# Patient Record
Sex: MALE | Race: BLACK OR AFRICAN AMERICAN | Employment: FULL TIME | URBAN - METROPOLITAN AREA
[De-identification: names, ages, dates, MRNs, and addresses within clinical notes are randomized per-mention and may not be internally consistent; named-entity substitution may affect disease eponyms.]

---

## 2023-02-15 ENCOUNTER — HOSPITAL ENCOUNTER (EMERGENCY)
Age: 28
Discharge: HOME | End: 2023-02-15
Attending: EMERGENCY MEDICINE

## 2023-02-15 ENCOUNTER — APPOINTMENT (OUTPATIENT)
Dept: GENERAL RADIOLOGY | Age: 28
End: 2023-02-15

## 2023-02-15 VITALS
TEMPERATURE: 96.8 F | DIASTOLIC BLOOD PRESSURE: 73 MMHG | WEIGHT: 160 LBS | RESPIRATION RATE: 18 BRPM | HEART RATE: 96 BPM | HEIGHT: 75 IN | BODY MASS INDEX: 19.89 KG/M2 | SYSTOLIC BLOOD PRESSURE: 126 MMHG | OXYGEN SATURATION: 98 %

## 2023-02-15 DIAGNOSIS — M79.675 TOE PAIN, LEFT: Primary | ICD-10-CM

## 2023-02-15 PROCEDURE — 6370000000 HC RX 637 (ALT 250 FOR IP)

## 2023-02-15 PROCEDURE — 99283 EMERGENCY DEPT VISIT LOW MDM: CPT

## 2023-02-15 PROCEDURE — 73630 X-RAY EXAM OF FOOT: CPT

## 2023-02-15 RX ORDER — IBUPROFEN 400 MG/1
400 TABLET ORAL ONCE
Status: COMPLETED | OUTPATIENT
Start: 2023-02-15 | End: 2023-02-15

## 2023-02-15 RX ORDER — IBUPROFEN 600 MG/1
600 TABLET ORAL 3 TIMES DAILY PRN
Qty: 21 TABLET | Refills: 0 | Status: SHIPPED | OUTPATIENT
Start: 2023-02-15 | End: 2023-02-22

## 2023-02-15 RX ADMIN — IBUPROFEN 400 MG: 400 TABLET, FILM COATED ORAL at 20:47

## 2023-02-15 ASSESSMENT — PAIN DESCRIPTION - DESCRIPTORS: DESCRIPTORS: ACHING

## 2023-02-15 ASSESSMENT — PAIN DESCRIPTION - LOCATION
LOCATION: TOE (COMMENT WHICH ONE)
LOCATION: FOOT

## 2023-02-15 ASSESSMENT — PAIN - FUNCTIONAL ASSESSMENT: PAIN_FUNCTIONAL_ASSESSMENT: 0-10

## 2023-02-15 ASSESSMENT — PAIN SCALES - GENERAL
PAINLEVEL_OUTOF10: 6
PAINLEVEL_OUTOF10: 5

## 2023-02-15 ASSESSMENT — PAIN DESCRIPTION - ORIENTATION: ORIENTATION: LEFT

## 2023-02-15 NOTE — Clinical Note
Linda Godfrey was seen and treated in our emergency department on 2/15/2023. He may return to work on 02/16/2023. If you have any questions or concerns, please don't hesitate to call.       Peña Tanner MD

## 2023-02-16 NOTE — ED NOTES
Pt was discharged from the ER. Discharge paperwork was reviewed with the patient. Patient had no further questions and showed an understanding of instructions.          Jb Young RN  02/15/23 1661

## 2023-02-16 NOTE — ED PROVIDER NOTES
[facility]     Emergency Department     Faculty Attestation        I performed a history and physical examination of the patient and discussed management with the resident. I reviewed the residents note and agree with the documented findings and plan of care. Any areas of disagreement are noted on the chart. I was personally present for the key portions of any procedures. I have documented in the chart those procedures where I was not present during the key portions. I have reviewed the emergency nurses triage note. I agree with the chief complaint, past medical history, past surgical history, allergies, medications, social and family history as documented unless otherwise noted below. For Physician Assistant/ Nurse Practitioner cases/documentation I have personally evaluated this patient and have completed at least one if not all key elements of the E/M (history, physical exam, and MDM). Additional findings are as noted. Vital Signs: BP: 126/73  Heart Rate: 96  Resp: 18  Temp: 96.8 °F (36 °C) SpO2: 98 %  PCP:  Kyrie Valdes MD    Pertinent Comments:     Patient is a 60-year-old male who stubbed his foot putting something together this morning at home. Has pain over the distal metatarsals as well as middle toes. Neurovascular intact surrounding with capillary refill brisk and less than 2 seconds with sensation light touch intact and strength 5/5. Assessment/plan: Patient to distal foot and toes and will obtain x-ray as well as pain control and reevaluate after    Critical Care  None      (Please note that portions of this note were completed with a voice recognition program. Efforts were made to edit the dictations but occasionally words are mis-transcribed.  Whenever words are used in this note in any gender, they shall be construed as though they were used in the gender appropriate to the circumstances; and whenever words are used in this note in the singular or plural form, they shall be construed as though they were used in the form appropriate to the circumstances.)    MD Harry Orellana  Attending Emergency Medicine Physician            Rosalio Berumen MD  02/15/23 Jeaneth Liz MD  02/15/23 2004

## 2023-02-16 NOTE — DISCHARGE INSTRUCTIONS
Thank you for visiting [facility] Emergency Department. You need to call Kailyn Arellano MD to make an appointment as directed for follow up. Should you have any questions regarding your care or further treatment, please call [facility] Emergency Department at [phone]. Please return to emergency department for any new or worsening symptoms including any numbness, weakness, tingling.

## 2023-02-16 NOTE — ED PROVIDER NOTES
[facility] ED  Emergency Department Encounter  Emergency Medicine Resident     Pt Name:Darryl Rodriguez  MRN: [id number]  [place] 1995  Date of evaluation: 2/15/23  PCP:  Patsy Nova MD      [address] [place] [address]       Chief Complaint   Patient presents with    Toe Pain     Left 4th toe, stubbed at work        HISTORY OF PRESENT ILLNESS  (Location/Symptom, Timing/Onset, Context/Setting, Quality, Duration, Modifying Factors, Severity.)      Darryl Rodriguez is a 33 y.o. male who presents with complaints of left dorsal third and fourth toe pain status post stubbing toe on daughter's bed when assembling it this morning. Notes he went to work after that had continued pain. No numbness, weakness, tingling. No other medical problems. Does not take any regular medications. PAST MEDICAL / SURGICAL / SOCIAL / FAMILY HISTORY      has no past medical history on file. Reviewed with patient     has no past surgical history on file. Reviewed with patient    Social History     Socioeconomic History    Marital status: Single     Spouse name: Not on file    Number of children: Not on file    Years of education: Not on file    Highest education level: Not on file   Occupational History    Not on file   Tobacco Use    Smoking status: Never    Smokeless tobacco: Not on file   Substance and Sexual Activity    Alcohol use: No    Drug use: No    Sexual activity: Not on file   Other Topics Concern    Not on file   Social History Narrative    Not on file     Social Determinants of Health     Financial Resource Strain: Not on file   Food Insecurity: Not on file   Transportation Needs: Not on file   Physical Activity: Not on file   Stress: Not on file   Social Connections: Not on file   Intimate Partner Violence: Not on file   Housing Stability: Not on file       History reviewed. No pertinent family history. Allergies:  Patient has no known allergies.     Home Medications:  Prior to Admission medications Medication Sig Start Date End Date Taking? Authorizing Provider   ibuprofen (ADVIL;MOTRIN) 600 MG tablet Take 1 tablet by mouth 3 times daily as needed for Pain 2/15/23 2/22/23 Yes Ap Flores MD   acetaminophen-codeine (TYLENOL/CODEINE #3) 300-30 MG per tablet Take 1 tablet by mouth every 4 hours as needed for Pain 5/28/15   Chuyita Tam PA-C   EPINEPHrine (EPIPEN 2-LIBERTAD) 0.3 MG/0.3ML SOAJ injection Inject 0.3 mLs into the muscle once as needed for up to 1 dose. Use as directed for allergic reaction 2/25/15 2/25/15  Maribeth Kaye MD   desoximetasone (TOPICORT) 0.25 % cream Apply topically 2 times daily. 2/25/15   Maribeth Kaye MD       REVIEW OF SYSTEMS    (2-9 systems for level 4, 10 or more for level 5)      Review of Systems   Constitutional:  Negative for chills and fever. HENT:  Negative for congestion and rhinorrhea. Eyes:  Negative for photophobia and visual disturbance. Respiratory:  Negative for shortness of breath and wheezing. Cardiovascular:  Negative for chest pain and palpitations. Gastrointestinal:  Negative for abdominal pain, nausea and vomiting. Genitourinary:  Negative for dysuria and frequency. Musculoskeletal:  Negative for back pain and neck pain. Positive for left third and fourth toe pain  Skin:  Negative for rash and wound. Neurological:  Negative for dizziness and headaches. PHYSICAL EXAM   (up to 7 for level 4, 8 or more for level 5)      INITIAL VITALS:   /73   Pulse 96   Temp 96.8 °F (36 °C) (Oral)   Resp 18   Ht 6' 3\" (1.905 m)   Wt 160 lb (72.6 kg)   SpO2 98%   BMI 20.00 kg/m²     Physical Exam  Vitals and nursing note reviewed. Constitutional:       General: He is not in acute distress. HENT:      Head: Atraumatic. Right Ear: External ear normal.      Left Ear: External ear normal.      Nose: Nose normal.      Mouth/Throat:      Mouth: Mucous membranes are moist.      Pharynx: Oropharynx is clear.    Eyes: Conjunctiva/sclera: Conjunctivae normal.   Cardiovascular:      Rate and Rhythm: Normal rate and regular rhythm. Pulses: Normal pulses. Pulmonary:      Effort: Pulmonary effort is normal. No respiratory distress. Breath sounds: Normal breath sounds. No wheezing. Abdominal:      Palpations: Abdomen is soft. Tenderness: There is no abdominal tenderness. Musculoskeletal:         General: Normal range of motion. Cervical back: Normal range of motion. Tenderness to dorsal aspect of the proximal third and fourth great toe with minimal ecchymosis, DP pulse intact, neurovascularly intact  Skin:     General: Skin is warm and dry. Capillary Refill: Capillary refill takes less than 2 seconds. Neurological:      General: No focal deficit present. Mental Status: He is alert and oriented to person, place, and time. DIFFERENTIAL  DIAGNOSIS     PLAN (LABS / IMAGING / EKG):  Orders Placed This Encounter   Procedures    XR FOOT LEFT (MIN 3 VIEWS)       MEDICATIONS ORDERED:  Orders Placed This Encounter   Medications    ibuprofen (ADVIL;MOTRIN) tablet 400 mg    ibuprofen (ADVIL;MOTRIN) 600 MG tablet     Sig: Take 1 tablet by mouth 3 times daily as needed for Pain     Dispense:  21 tablet     Refill:  0       DDX: Contusion, fracture    DIAGNOSTIC RESULTS / EMERGENCY DEPARTMENT COURSE / MDM   LAB RESULTS:  No results found for this visit on 02/15/23. IMPRESSION: Foot pain    RADIOLOGY:  XR FOOT LEFT (MIN 3 VIEWS)   Final Result   No acute osseous abnormality. EMERGENCY DEPARTMENT COURSE:  71-year-old male presented to ED with complaints of pain to dorsal aspect of third and fourth toe on left foot status post assembling furniture this morning prior to going to work and has had continued pain throughout the day. On exam had tenderness to proximal aspect of third and fourth toes, dorsal aspect, DP pulse intact, neurovascularly intact. Pending x-ray, ibuprofen, reassess. X-rays nonacute. Patient discharged. ED Course as of 02/16/23 0315   Wed Feb 15, 2023   2122 Updated on results, return precautions discussed. Patient agreeable to plan. [AR]      ED Course User Index  [AR] Manuel Mckay MD       No notes of [facility] Admission Criteria type on file. PROCEDURES:  None    CONSULTS:  None    FINAL IMPRESSION      1.  Toe pain, left          DISPOSITION / PLAN     DISPOSITION Decision To Discharge 02/15/2023 08:55:48 PM      PATIENT REFERRED TO:  Dwaine Mane MD  [place] [address]  [address] [address]  [phone]    In 1 week      [facility]  [address] [address]  [phone]    As needed    DISCHARGE MEDICATIONS:  Discharge Medication List as of 2/15/2023  8:58 PM        START taking these medications    Details   ibuprofen (ADVIL;MOTRIN) 600 MG tablet Take 1 tablet by mouth 3 times daily as needed for Pain, Disp-21 tablet, R-0Print             Lizbeth Sanches MD  Emergency Medicine Resident    (Please note that portions of thisnote were completed with a voice recognition program.  Efforts were made to edit the dictations but occasionally words are mis-transcribed.)         Manuel Mckay MD  Resident  02/16/23 5618

## 2023-02-16 NOTE — ED NOTES
Patient presents to the ED today with left toe pain after dropping a metal pole on his left foot this morning. Patient states that the pain has been increasing as the day goes on.      Froy Herrera RN  02/15/23 1950

## 2023-05-26 ENCOUNTER — HOSPITAL ENCOUNTER (INPATIENT)
Age: 28
LOS: 8 days | Discharge: HOME | DRG: 751 | End: 2023-06-03
Attending: EMERGENCY MEDICINE | Admitting: PSYCHIATRY & NEUROLOGY
Payer: COMMERCIAL

## 2023-05-26 DIAGNOSIS — F23 ACUTE PSYCHOSIS (HCC): Primary | ICD-10-CM

## 2023-05-26 LAB
ALBUMIN SERPL-MCNC: 4.5 G/DL (ref 3.5–5.2)
ALP SERPL-CCNC: 75 U/L (ref 40–129)
ALT SERPL-CCNC: 30 U/L (ref 5–41)
ANION GAP SERPL CALCULATED.3IONS-SCNC: 12 MMOL/L (ref 9–17)
APAP SERPL-MCNC: <5 UG/ML (ref 10–30)
AST SERPL-CCNC: 28 U/L
ATYPICAL LYMPHOCYTE ABSOLUTE COUNT: 0.17 K/UL
ATYPICAL LYMPHOCYTES: 2 %
BASOPHILS # BLD: 0 K/UL (ref 0–0.2)
BASOPHILS NFR BLD: 0 % (ref 0–2)
BILIRUB SERPL-MCNC: 0.8 MG/DL (ref 0.3–1.2)
BUN SERPL-MCNC: 9 MG/DL (ref 6–20)
CALCIUM SERPL-MCNC: 9.5 MG/DL (ref 8.6–10.4)
CHLORIDE SERPL-SCNC: 102 MMOL/L (ref 98–107)
CO2 SERPL-SCNC: 25 MMOL/L (ref 20–31)
CREAT SERPL-MCNC: 0.96 MG/DL (ref 0.7–1.2)
EOSINOPHIL # BLD: 0.6 K/UL (ref 0–0.4)
EOSINOPHILS RELATIVE PERCENT: 7 % (ref 0–4)
ERYTHROCYTE [DISTWIDTH] IN BLOOD BY AUTOMATED COUNT: 15 % (ref 11.5–14.9)
ETHANOL PERCENT: <0.01 %
ETHANOLAMINE SERPL-MCNC: <10 MG/DL
GFR SERPL CREATININE-BSD FRML MDRD: >60 ML/MIN/1.73M2
GLUCOSE SERPL-MCNC: 88 MG/DL (ref 70–99)
HCT VFR BLD AUTO: 45.7 % (ref 41–53)
HGB BLD-MCNC: 15.7 G/DL (ref 13.5–17.5)
LYMPHOCYTES # BLD: 29 % (ref 24–44)
LYMPHOCYTES NFR BLD: 2.49 K/UL (ref 1–4.8)
MCH RBC QN AUTO: 33 PG (ref 26–34)
MCHC RBC AUTO-ENTMCNC: 34.3 G/DL (ref 31–37)
MCV RBC AUTO: 96.3 FL (ref 80–100)
MONOCYTES NFR BLD: 0.77 K/UL (ref 0.1–1.3)
MONOCYTES NFR BLD: 9 % (ref 1–7)
MORPHOLOGY: ABNORMAL
NEUTROPHILS NFR BLD: 53 % (ref 36–66)
NEUTS SEG NFR BLD: 4.57 K/UL (ref 1.3–9.1)
PLATELET # BLD AUTO: 220 K/UL (ref 150–450)
PMV BLD AUTO: 8.6 FL (ref 6–12)
POTASSIUM SERPL-SCNC: 4.2 MMOL/L (ref 3.7–5.3)
PROT SERPL-MCNC: 7.1 G/DL (ref 6.4–8.3)
RBC # BLD AUTO: 4.75 M/UL (ref 4.5–5.9)
SALICYLATES SERPL-MCNC: <1 MG/DL (ref 3–10)
SODIUM SERPL-SCNC: 139 MMOL/L (ref 135–144)
WBC OTHER # BLD: 8.6 K/UL (ref 3.5–11)

## 2023-05-26 PROCEDURE — 85025 COMPLETE CBC W/AUTO DIFF WBC: CPT

## 2023-05-26 PROCEDURE — 2040000000 HC PSYCH ICU R&B

## 2023-05-26 PROCEDURE — 6360000002 HC RX W HCPCS

## 2023-05-26 PROCEDURE — G0480 DRUG TEST DEF 1-7 CLASSES: HCPCS

## 2023-05-26 PROCEDURE — 80179 DRUG ASSAY SALICYLATE: CPT

## 2023-05-26 PROCEDURE — 36415 COLL VENOUS BLD VENIPUNCTURE: CPT

## 2023-05-26 PROCEDURE — 99285 EMERGENCY DEPT VISIT HI MDM: CPT

## 2023-05-26 PROCEDURE — 80053 COMPREHEN METABOLIC PANEL: CPT

## 2023-05-26 PROCEDURE — 80143 DRUG ASSAY ACETAMINOPHEN: CPT

## 2023-05-26 RX ORDER — TRAZODONE HYDROCHLORIDE 50 MG/1
50 TABLET ORAL NIGHTLY PRN
Status: DISCONTINUED | OUTPATIENT
Start: 2023-05-26 | End: 2023-06-03 | Stop reason: HOSPADM

## 2023-05-26 RX ORDER — DIPHENHYDRAMINE HYDROCHLORIDE 50 MG/ML
50 INJECTION INTRAMUSCULAR; INTRAVENOUS EVERY 6 HOURS PRN
Status: DISCONTINUED | OUTPATIENT
Start: 2023-05-26 | End: 2023-06-03 | Stop reason: HOSPADM

## 2023-05-26 RX ORDER — M-VIT,TX,IRON,MINS/CALC/FOLIC 27MG-0.4MG
1 TABLET ORAL DAILY
Status: ON HOLD | COMMUNITY
End: 2023-05-27

## 2023-05-26 RX ORDER — LORAZEPAM 2 MG/ML
INJECTION INTRAMUSCULAR
Status: COMPLETED
Start: 2023-05-26 | End: 2023-05-26

## 2023-05-26 RX ORDER — IBUPROFEN 400 MG/1
400 TABLET ORAL EVERY 6 HOURS PRN
Status: DISCONTINUED | OUTPATIENT
Start: 2023-05-26 | End: 2023-06-03 | Stop reason: HOSPADM

## 2023-05-26 RX ORDER — HALOPERIDOL 5 MG/1
5 TABLET ORAL EVERY 6 HOURS PRN
Status: DISCONTINUED | OUTPATIENT
Start: 2023-05-26 | End: 2023-06-03 | Stop reason: HOSPADM

## 2023-05-26 RX ORDER — HALOPERIDOL 5 MG/ML
INJECTION INTRAMUSCULAR
Status: COMPLETED
Start: 2023-05-26 | End: 2023-05-26

## 2023-05-26 RX ORDER — RISPERIDONE 1 MG/1
1 TABLET ORAL 2 TIMES DAILY
Status: ON HOLD | COMMUNITY
End: 2023-05-27

## 2023-05-26 RX ORDER — HALOPERIDOL 5 MG/ML
5 INJECTION INTRAMUSCULAR EVERY 6 HOURS PRN
Status: DISCONTINUED | OUTPATIENT
Start: 2023-05-26 | End: 2023-06-03 | Stop reason: HOSPADM

## 2023-05-26 RX ORDER — LORAZEPAM 2 MG/ML
2 INJECTION INTRAMUSCULAR EVERY 6 HOURS PRN
Status: DISCONTINUED | OUTPATIENT
Start: 2023-05-26 | End: 2023-06-03 | Stop reason: HOSPADM

## 2023-05-26 RX ORDER — DIPHENHYDRAMINE HYDROCHLORIDE 50 MG/ML
INJECTION INTRAMUSCULAR; INTRAVENOUS
Status: COMPLETED
Start: 2023-05-26 | End: 2023-05-26

## 2023-05-26 RX ORDER — HYDROXYZINE 50 MG/1
50 TABLET, FILM COATED ORAL 3 TIMES DAILY PRN
Status: DISCONTINUED | OUTPATIENT
Start: 2023-05-26 | End: 2023-06-03 | Stop reason: HOSPADM

## 2023-05-26 RX ORDER — ACETAMINOPHEN 325 MG/1
650 TABLET ORAL EVERY 4 HOURS PRN
Status: DISCONTINUED | OUTPATIENT
Start: 2023-05-26 | End: 2023-06-03 | Stop reason: HOSPADM

## 2023-05-26 RX ORDER — MAGNESIUM HYDROXIDE/ALUMINUM HYDROXICE/SIMETHICONE 120; 1200; 1200 MG/30ML; MG/30ML; MG/30ML
30 SUSPENSION ORAL EVERY 6 HOURS PRN
Status: DISCONTINUED | OUTPATIENT
Start: 2023-05-26 | End: 2023-06-03 | Stop reason: HOSPADM

## 2023-05-26 RX ORDER — POLYETHYLENE GLYCOL 3350 17 G/17G
17 POWDER, FOR SOLUTION ORAL DAILY PRN
Status: DISCONTINUED | OUTPATIENT
Start: 2023-05-26 | End: 2023-06-03 | Stop reason: HOSPADM

## 2023-05-26 RX ORDER — LORAZEPAM 1 MG/1
2 TABLET ORAL EVERY 6 HOURS PRN
Status: DISCONTINUED | OUTPATIENT
Start: 2023-05-26 | End: 2023-06-03 | Stop reason: HOSPADM

## 2023-05-26 RX ADMIN — LORAZEPAM 2 MG: 2 INJECTION INTRAMUSCULAR; INTRAVENOUS at 11:30

## 2023-05-26 RX ADMIN — DIPHENHYDRAMINE HYDROCHLORIDE 50 MG: 50 INJECTION, SOLUTION INTRAMUSCULAR; INTRAVENOUS at 11:30

## 2023-05-26 RX ADMIN — HALOPERIDOL LACTATE 5 MG: 5 INJECTION, SOLUTION INTRAMUSCULAR at 11:30

## 2023-05-26 ASSESSMENT — LIFESTYLE VARIABLES
HOW OFTEN DO YOU HAVE A DRINK CONTAINING ALCOHOL: NEVER
HOW MANY STANDARD DRINKS CONTAINING ALCOHOL DO YOU HAVE ON A TYPICAL DAY: PATIENT DOES NOT DRINK

## 2023-05-26 ASSESSMENT — SLEEP AND FATIGUE QUESTIONNAIRES
DO YOU USE A SLEEP AID: NO
DO YOU HAVE DIFFICULTY SLEEPING: NO

## 2023-05-26 ASSESSMENT — PAIN - FUNCTIONAL ASSESSMENT
PAIN_FUNCTIONAL_ASSESSMENT: NONE - DENIES PAIN
PAIN_FUNCTIONAL_ASSESSMENT: NONE - DENIES PAIN

## 2023-05-27 LAB
AMPHET UR QL SCN: NEGATIVE
BACTERIA URNS QL MICRO: NORMAL
BARBITURATES UR QL SCN: NEGATIVE
BENZODIAZ UR QL: NEGATIVE
BILIRUB UR QL STRIP: NEGATIVE
CANNABINOID SCREEN URINE: POSITIVE
CASTS #/AREA URNS LPF: NORMAL /LPF
CLARITY UR: ABNORMAL
COCAINE UR QL SCN: NEGATIVE
COLOR UR: ABNORMAL
EPI CELLS #/AREA URNS HPF: NORMAL /HPF
FENTANYL URINE: NEGATIVE
GLUCOSE UR STRIP-MCNC: NEGATIVE MG/DL
HGB UR QL STRIP.AUTO: NEGATIVE
KETONES UR STRIP-MCNC: ABNORMAL MG/DL
LEUKOCYTE ESTERASE UR QL STRIP: ABNORMAL
METHADONE SCREEN, URINE: NEGATIVE
NITRITE UR QL STRIP: NEGATIVE
OPIATES UR QL SCN: NEGATIVE
OXYCODONE SCREEN URINE: NEGATIVE
PCP UR QL SCN: NEGATIVE
PH UR STRIP: 7.5 [PH] (ref 5–8)
PROT UR STRIP-MCNC: NEGATIVE MG/DL
RBC #/AREA URNS HPF: NORMAL /HPF
SP GR UR STRIP: 1.02 (ref 1–1.03)
TEST INFORMATION: ABNORMAL
UROBILINOGEN UR STRIP-ACNC: ABNORMAL
WBC #/AREA URNS HPF: NORMAL /HPF

## 2023-05-27 PROCEDURE — 99222 1ST HOSP IP/OBS MODERATE 55: CPT | Performed by: INTERNAL MEDICINE

## 2023-05-27 PROCEDURE — 81001 URINALYSIS AUTO W/SCOPE: CPT

## 2023-05-27 PROCEDURE — 80307 DRUG TEST PRSMV CHEM ANLYZR: CPT

## 2023-05-27 PROCEDURE — 6360000002 HC RX W HCPCS: Performed by: NURSE PRACTITIONER

## 2023-05-27 PROCEDURE — 6370000000 HC RX 637 (ALT 250 FOR IP): Performed by: NURSE PRACTITIONER

## 2023-05-27 PROCEDURE — 2040000000 HC PSYCH ICU R&B

## 2023-05-27 PROCEDURE — 6370000000 HC RX 637 (ALT 250 FOR IP): Performed by: PSYCHIATRY & NEUROLOGY

## 2023-05-27 RX ORDER — PALIPERIDONE 3 MG/1
3 TABLET, EXTENDED RELEASE ORAL DAILY
Status: DISCONTINUED | OUTPATIENT
Start: 2023-05-27 | End: 2023-05-28

## 2023-05-27 RX ADMIN — HALOPERIDOL 5 MG: 5 TABLET ORAL at 16:30

## 2023-05-27 RX ADMIN — PALIPERIDONE 3 MG: 3 TABLET, EXTENDED RELEASE ORAL at 10:10

## 2023-05-27 RX ADMIN — HALOPERIDOL LACTATE 5 MG: 5 INJECTION, SOLUTION INTRAMUSCULAR at 07:35

## 2023-05-27 RX ADMIN — DIPHENHYDRAMINE HYDROCHLORIDE 50 MG: 50 INJECTION, SOLUTION INTRAMUSCULAR; INTRAVENOUS at 07:35

## 2023-05-27 RX ADMIN — LORAZEPAM 2 MG: 1 TABLET ORAL at 16:30

## 2023-05-27 RX ADMIN — LORAZEPAM 2 MG: 2 INJECTION INTRAMUSCULAR; INTRAVENOUS at 07:35

## 2023-05-28 PROCEDURE — 2040000000 HC PSYCH ICU R&B

## 2023-05-28 PROCEDURE — 6360000002 HC RX W HCPCS: Performed by: NURSE PRACTITIONER

## 2023-05-28 PROCEDURE — 6370000000 HC RX 637 (ALT 250 FOR IP): Performed by: PSYCHIATRY & NEUROLOGY

## 2023-05-28 PROCEDURE — 6370000000 HC RX 637 (ALT 250 FOR IP): Performed by: NURSE PRACTITIONER

## 2023-05-28 RX ORDER — PALIPERIDONE 6 MG/1
6 TABLET, EXTENDED RELEASE ORAL DAILY
Status: DISCONTINUED | OUTPATIENT
Start: 2023-05-29 | End: 2023-05-30

## 2023-05-28 RX ADMIN — LORAZEPAM 2 MG: 2 INJECTION INTRAMUSCULAR; INTRAVENOUS at 16:41

## 2023-05-28 RX ADMIN — PALIPERIDONE 3 MG: 3 TABLET, EXTENDED RELEASE ORAL at 08:05

## 2023-05-28 RX ADMIN — HALOPERIDOL 5 MG: 5 TABLET ORAL at 09:13

## 2023-05-28 RX ADMIN — HALOPERIDOL LACTATE 5 MG: 5 INJECTION, SOLUTION INTRAMUSCULAR at 16:41

## 2023-05-28 RX ADMIN — DIPHENHYDRAMINE HYDROCHLORIDE 50 MG: 50 INJECTION, SOLUTION INTRAMUSCULAR; INTRAVENOUS at 16:41

## 2023-05-28 RX ADMIN — LORAZEPAM 2 MG: 1 TABLET ORAL at 09:13

## 2023-05-28 RX ADMIN — HYDROXYZINE HYDROCHLORIDE 50 MG: 50 TABLET, FILM COATED ORAL at 09:13

## 2023-05-29 PROCEDURE — 6370000000 HC RX 637 (ALT 250 FOR IP): Performed by: PSYCHIATRY & NEUROLOGY

## 2023-05-29 PROCEDURE — 1240000000 HC EMOTIONAL WELLNESS R&B

## 2023-05-29 PROCEDURE — 6360000002 HC RX W HCPCS: Performed by: NURSE PRACTITIONER

## 2023-05-29 PROCEDURE — 6370000000 HC RX 637 (ALT 250 FOR IP): Performed by: NURSE PRACTITIONER

## 2023-05-29 RX ADMIN — HALOPERIDOL LACTATE 5 MG: 5 INJECTION, SOLUTION INTRAMUSCULAR at 10:18

## 2023-05-29 RX ADMIN — LORAZEPAM 2 MG: 2 INJECTION INTRAMUSCULAR; INTRAVENOUS at 10:18

## 2023-05-29 RX ADMIN — LORAZEPAM 2 MG: 1 TABLET ORAL at 07:03

## 2023-05-29 RX ADMIN — DIPHENHYDRAMINE HYDROCHLORIDE 50 MG: 50 INJECTION, SOLUTION INTRAMUSCULAR; INTRAVENOUS at 10:18

## 2023-05-29 RX ADMIN — HALOPERIDOL 5 MG: 5 TABLET ORAL at 07:04

## 2023-05-29 RX ADMIN — PALIPERIDONE 6 MG: 6 TABLET, EXTENDED RELEASE ORAL at 07:35
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
## 2023-05-29 NOTE — BH NOTE
Emergency Medication Follow-Up Note:    PRN medication of IM Haldol 5 mg, Ativan 2 mg, Benadryl 50 mg was effective as evidence by patient resting in room. Patient denies medication side effects. Will continue to monitor and provide support as needed.
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
## 2023-05-30 PROCEDURE — 6360000002 HC RX W HCPCS: Performed by: NURSE PRACTITIONER

## 2023-05-30 PROCEDURE — 1240000000 HC EMOTIONAL WELLNESS R&B

## 2023-05-30 PROCEDURE — 6370000000 HC RX 637 (ALT 250 FOR IP)

## 2023-05-30 PROCEDURE — 6370000000 HC RX 637 (ALT 250 FOR IP): Performed by: PSYCHIATRY & NEUROLOGY

## 2023-05-30 RX ORDER — PALIPERIDONE 9 MG/1
9 TABLET, EXTENDED RELEASE ORAL DAILY
Status: DISCONTINUED | OUTPATIENT
Start: 2023-05-31 | End: 2023-06-03 | Stop reason: HOSPADM

## 2023-05-30 RX ORDER — DIVALPROEX SODIUM 500 MG/1
500 TABLET, EXTENDED RELEASE ORAL 2 TIMES DAILY
Status: DISCONTINUED | OUTPATIENT
Start: 2023-05-30 | End: 2023-05-31

## 2023-05-30 RX ADMIN — HALOPERIDOL LACTATE 5 MG: 5 INJECTION, SOLUTION INTRAMUSCULAR at 16:23

## 2023-05-30 RX ADMIN — LORAZEPAM 2 MG: 2 INJECTION INTRAMUSCULAR; INTRAVENOUS at 11:31

## 2023-05-30 RX ADMIN — DIPHENHYDRAMINE HYDROCHLORIDE 50 MG: 50 INJECTION, SOLUTION INTRAMUSCULAR; INTRAVENOUS at 11:31

## 2023-05-30 RX ADMIN — PALIPERIDONE 6 MG: 6 TABLET, EXTENDED RELEASE ORAL at 07:59

## 2023-05-30 RX ADMIN — LORAZEPAM 2 MG: 2 INJECTION INTRAMUSCULAR; INTRAVENOUS at 16:23

## 2023-05-30 RX ADMIN — DIPHENHYDRAMINE HYDROCHLORIDE 50 MG: 50 INJECTION, SOLUTION INTRAMUSCULAR; INTRAVENOUS at 16:23

## 2023-05-30 RX ADMIN — DIVALPROEX SODIUM 500 MG: 500 TABLET, EXTENDED RELEASE ORAL at 21:14

## 2023-05-30 RX ADMIN — HALOPERIDOL LACTATE 5 MG: 5 INJECTION, SOLUTION INTRAMUSCULAR at 11:31

## 2023-05-30 ASSESSMENT — PAIN SCALES - GENERAL: PAINLEVEL_OUTOF10: 4

## 2023-05-30 NOTE — BH NOTE
Emergency Medication Follow-Up Note:    PRN medication of Benadryl 50mg IM, Haldol 5mg, and Ativan 2mg IM  was effective as evidence by patient regaining behavioral control, absence of behavior warranting emergency medication, socializing with peers. Patient denies medication side effects. Will continue to monitor and provide support as needed.

## 2023-05-30 NOTE — GROUP NOTE
Group Therapy Note    Date: 5/30/2023    Group Start Time: 1100  Group End Time: 8381  Group Topic: Cognitive Skills    RAFI Prajapati, CTRS        Group Therapy Note    Attendees: 1/7     Topic: To increase social interaction and to practice decision making, concentration, and communication skills. Patient did not participate in Cognitive Skills Group at 11:00, despite staff encouragement and explanation of benefits. Pt paces in day room and is up at station frequently. Pt complains about TV and radio and makes comments about group \"taking too long\". Pt has an almost constant monologue of complaining, and calls RT and peer \"bitches\", tells peer to \"shut the fu*k up\" when peer is not even talking to him during group time. Q15 minute safety checks maintained for patient safety and will continue to encourage patient to attend unit programming.            Discipline Responsible: Psychoeducational Specialist        Signature:  Sybil Garcia

## 2023-05-30 NOTE — BH NOTE
Emergency PRN Medication Administration Note:      Patient is Agitated and Disruptive, and Dangerous as evidence by threatening to call , making threats \"Keep pushing and find out. You'll see what's bout to happen. \" Patient is unable to redirect. Staff attempted to find and relieve the distress by Talking to patient, Refocusing on new activity, and Offering suggestions. Patient is currently continuing to escalate and accepted PRN medications. Medication Administered as prescribed: Benadryl 50mg IM, Haldol 5mg, and Ativan 2mg IM. Patient Tolerated medication administration. Will continue to monitor, offer support, and reassess.

## 2023-05-30 NOTE — BH NOTE
Emergency PRN Medication Administration Note:      Patient is Agitated and Disruptive as evidence by reaching behind nurses station, non redirectable, responding in aggressive manner. Staff attempted to find and relieve the distress by Talking to patient, Refocusing on new activity, Offering suggestions, Checking for undiagnosed pain, and Administer PRN medications Patient is currently  accepted PRN medications. Medication Administered as prescribed: IM Haldol 5 mg, Ativan 2 mg, Benadryl 50 mg. Patient Tolerated medication administration. Will continue to monitor, offer support, and reassess.

## 2023-05-30 NOTE — BH NOTE
Emergency Medication Follow-Up Note:    PRN medication of Intramuscular Haldol 5 mg, Ativan 2 mg, Benadryl 50 mg was effective as evidence by Patient regain behavioral control, absence of behavior warranting emergency medication, socializing with peers. Patient denies medication side effects. Will continue to monitor and provide support as needed.

## 2023-05-31 PROCEDURE — 6370000000 HC RX 637 (ALT 250 FOR IP): Performed by: PSYCHIATRY & NEUROLOGY

## 2023-05-31 PROCEDURE — 1240000000 HC EMOTIONAL WELLNESS R&B

## 2023-05-31 PROCEDURE — 6370000000 HC RX 637 (ALT 250 FOR IP)

## 2023-05-31 PROCEDURE — 6360000002 HC RX W HCPCS

## 2023-05-31 RX ORDER — BENZTROPINE MESYLATE 1 MG/1
1 TABLET ORAL 2 TIMES DAILY
Status: DISCONTINUED | OUTPATIENT
Start: 2023-05-31 | End: 2023-06-01

## 2023-05-31 RX ORDER — BENZTROPINE MESYLATE 1 MG/ML
1 INJECTION INTRAMUSCULAR; INTRAVENOUS ONCE
Status: COMPLETED | OUTPATIENT
Start: 2023-05-31 | End: 2023-05-31

## 2023-05-31 RX ADMIN — BENZTROPINE MESYLATE 1 MG: 1 INJECTION INTRAMUSCULAR; INTRAVENOUS at 11:47

## 2023-05-31 RX ADMIN — DIVALPROEX SODIUM 750 MG: 500 TABLET, EXTENDED RELEASE ORAL at 20:33

## 2023-05-31 RX ADMIN — PALIPERIDONE 9 MG: 9 TABLET, EXTENDED RELEASE ORAL at 08:36

## 2023-05-31 RX ADMIN — DIVALPROEX SODIUM 500 MG: 500 TABLET, EXTENDED RELEASE ORAL at 08:36

## 2023-05-31 RX ADMIN — BENZTROPINE MESYLATE 1 MG: 1 TABLET ORAL at 10:32

## 2023-05-31 RX ADMIN — BENZTROPINE MESYLATE 1 MG: 1 TABLET ORAL at 20:33

## 2023-05-31 NOTE — GROUP NOTE
Group Therapy Note    Date: 5/31/2023    Group Start Time: 1430  Group End Time: 2419  Group Topic: Cognitive Skills    ALIE Kohler        Group Therapy Note    Attendees: 2/6     Comments:     Topic: To increase social interaction, decision making, and communication skills. Patient did not participate in Cognitive Skills Group at 14:30, despite staff encouragement and explanation of benefits. Pt had initially asked if he had to attend group. Pt was encouraged to attend and observe/talk at first while exploring task. Pt stated he would try that. Pt was up in day room, glanced briefly at task but chose to stay up pacing slowly and singing at times to music. Pt was calm and in behavioral control, much improved from yesterday. Q15 minute safety checks maintained for patient safety and will continue to encourage patient to attend unit programming.            Discipline Responsible: Psychoeducational Specialist        Signature:  Lucero Kent

## 2023-06-01 PROCEDURE — 1240000000 HC EMOTIONAL WELLNESS R&B

## 2023-06-01 PROCEDURE — 6370000000 HC RX 637 (ALT 250 FOR IP)

## 2023-06-01 PROCEDURE — 6370000000 HC RX 637 (ALT 250 FOR IP): Performed by: PSYCHIATRY & NEUROLOGY

## 2023-06-01 RX ORDER — BENZTROPINE MESYLATE 1 MG/1
2 TABLET ORAL 2 TIMES DAILY
Status: DISCONTINUED | OUTPATIENT
Start: 2023-06-01 | End: 2023-06-03 | Stop reason: HOSPADM

## 2023-06-01 RX ADMIN — BENZTROPINE MESYLATE 1 MG: 1 TABLET ORAL at 08:20

## 2023-06-01 RX ADMIN — DIVALPROEX SODIUM 750 MG: 500 TABLET, EXTENDED RELEASE ORAL at 21:53

## 2023-06-01 RX ADMIN — PALIPERIDONE 9 MG: 9 TABLET, EXTENDED RELEASE ORAL at 08:20

## 2023-06-01 RX ADMIN — DIVALPROEX SODIUM 750 MG: 500 TABLET, EXTENDED RELEASE ORAL at 08:20

## 2023-06-01 RX ADMIN — BENZTROPINE MESYLATE 2 MG: 1 TABLET ORAL at 21:53

## 2023-06-01 NOTE — GROUP NOTE
Group Therapy Note    Date: 6/1/2023    Group Start Time: 1430  Group End Time: 2482  Group Topic: Cognitive Skills    [facility]    ALIE Muniz        Group Therapy Note    Attendees: 3/8     Patient's Goal: To increase social interaction,and practice problem solving, and communication skills. Notes:  Pt politely declined to attend group, pt was pacing calmly in hallway. RT reminded pt he was always welcome to attend groups. During group as pt walked by, he did call out relevant answers x2 to group questions. RT gave positive feedback r/t answers . Peers smiled in response to pt's positive answers. Status After Intervention:  Improved briefly     Participation Level: Active Listener from periphery, and briefly interactive      Participation Quality:  Attentive when on periphery and briefly interactive         Speech:  Normal          Thought Process/Content: Logical comments r/t topics x2        Affective Functioning: blunted, brightened        Mood: pacing but calm and in behavioral control, polite, initially aloof of group but did participate x2 rounds from periphery.      Level of consciousness:  Alert, and Attentive      Response to Learning: Attentive , able to verbalize current knowledge /experience x2, and Progressing to goal        Endings: None Reported     Modes of Intervention: Education, Support, Socialization, and Problem-solving        Discipline Responsible: Psychoeducational Specialist     Signature:  Harry Wood

## 2023-06-01 NOTE — GROUP NOTE
Group Therapy Note    Date: 5/31/2023    Group Start Time: 2000  Group End Time: 2030  Group Topic: Wrap-Up    RAFI PRITCHETT    Ethan Blackwood RN        Group Therapy Note    Attendees: 2/7       Patient's Goal:  \"To get my head cleared and get discharged soon. \"    Notes:  Patient participated in Wrap-Up/Relaxation group appropriately this evening. At times he is bizarre, but overall pleasant. He explored coping skills evening that he reports that he will utilize once he is discharged. Status After Intervention:  Improved    Participation Level:  Active Listener and Interactive    Participation Quality: Appropriate      Speech:  normal      Thought Process/Content: Linear at times, paranoid       Affective Functioning: Blunted, bizarre      Mood: anxious      Level of consciousness:  Alert and Oriented x4      Response to Learning: Able to verbalize current knowledge/experience, Able to verbalize/acknowledge new learning, and Able to retain information      Endings: None Reported    Modes of Intervention: Support, Socialization, and Exploration      Discipline Responsible: Registered Nurse      Signature:  Ethan Blackwood RN

## 2023-06-02 LAB
CHOLEST SERPL-MCNC: 132 MG/DL
CHOLESTEROL/HDL RATIO: 2.1
EKG ATRIAL RATE: 84 BPM
EKG P AXIS: 87 DEGREES
EKG P-R INTERVAL: 144 MS
EKG Q-T INTERVAL: 342 MS
EKG QRS DURATION: 80 MS
EKG QTC CALCULATION (BAZETT): 404 MS
EKG R AXIS: 83 DEGREES
EKG T AXIS: 75 DEGREES
EKG VENTRICULAR RATE: 84 BPM
EST. AVERAGE GLUCOSE BLD GHB EST-MCNC: 85 MG/DL
HBA1C MFR BLD: 4.6 % (ref 4–6)
HDLC SERPL-MCNC: 62 MG/DL
LDLC SERPL CALC-MCNC: 63 MG/DL (ref 0–130)
TRIGL SERPL-MCNC: 33 MG/DL

## 2023-06-02 PROCEDURE — 1240000000 HC EMOTIONAL WELLNESS R&B

## 2023-06-02 PROCEDURE — 36415 COLL VENOUS BLD VENIPUNCTURE: CPT

## 2023-06-02 PROCEDURE — 6370000000 HC RX 637 (ALT 250 FOR IP): Performed by: PSYCHIATRY & NEUROLOGY

## 2023-06-02 PROCEDURE — 80061 LIPID PANEL: CPT

## 2023-06-02 PROCEDURE — 6370000000 HC RX 637 (ALT 250 FOR IP)

## 2023-06-02 PROCEDURE — 83036 HEMOGLOBIN GLYCOSYLATED A1C: CPT

## 2023-06-02 PROCEDURE — 93005 ELECTROCARDIOGRAM TRACING: CPT

## 2023-06-02 RX ADMIN — DIVALPROEX SODIUM 750 MG: 500 TABLET, EXTENDED RELEASE ORAL at 20:49

## 2023-06-02 RX ADMIN — BENZTROPINE MESYLATE 2 MG: 1 TABLET ORAL at 08:31

## 2023-06-02 RX ADMIN — DIVALPROEX SODIUM 750 MG: 500 TABLET, EXTENDED RELEASE ORAL at 08:31

## 2023-06-02 RX ADMIN — PALIPERIDONE 9 MG: 9 TABLET, EXTENDED RELEASE ORAL at 08:38

## 2023-06-02 RX ADMIN — BENZTROPINE MESYLATE 2 MG: 1 TABLET ORAL at 20:49
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
## 2023-06-02 NOTE — GROUP NOTE
Group Therapy Note    Date: 6/2/2023    Group Start Time: 1430  Group End Time: 0812  Group Topic: Cognitive Skills    RAFI Byrne, CTRS        Group Therapy Note    Attendees: 1/9     Topic: To increase social interaction, concentration, and communication skills. Patient did not participate in Cognitive Group at 14:30, despite staff encouragement and explanation of benefits. Pt is up in day room ,polite on approach but seclusive to self during group time. Q15 minute safety checks maintained for patient safety and will continue to encourage patient to attend unit programming.            Discipline Responsible: Psychoeducational Specialist        Signature:  Fernando Araya

## 2023-06-02 NOTE — GROUP NOTE
Group Therapy Note    Date: 6/1/2023    Group Start Time: 2000  Group End Time: 2030  Group Topic: Wrap-Up    RAFI PRITCHETT    Rachel Layne RN        Group Therapy Note    Attendees: 4/10       Patient's Goal:  \"To get discharged tomorrow. \"    Notes:  Patient participated appropriately in group this evening. He reports that he is feeling ready for discharge and spent the evening social with selective peers. Status After Intervention:  Improved    Participation Level:  Active Listener and Interactive    Participation Quality: Appropriate      Speech:  normal      Thought Process/Content: Logical  Linear      Affective Functioning: Flat      Mood: euthymic      Level of consciousness:  Alert and Oriented x4      Response to Learning: Able to verbalize current knowledge/experience, Able to verbalize/acknowledge new learning, and Able to retain information      Endings: None Reported    Modes of Intervention: Support, Socialization, and Exploration      Discipline Responsible: Registered Nurse      Signature:  Rachel Layne RN

## 2023-06-03 VITALS
RESPIRATION RATE: 14 BRPM | OXYGEN SATURATION: 100 % | HEIGHT: 70 IN | WEIGHT: 160 LBS | HEART RATE: 87 BPM | TEMPERATURE: 98.2 F | SYSTOLIC BLOOD PRESSURE: 114 MMHG | BODY MASS INDEX: 22.9 KG/M2 | DIASTOLIC BLOOD PRESSURE: 53 MMHG

## 2023-06-03 PROCEDURE — 6370000000 HC RX 637 (ALT 250 FOR IP)

## 2023-06-03 PROCEDURE — 6370000000 HC RX 637 (ALT 250 FOR IP): Performed by: PSYCHIATRY & NEUROLOGY

## 2023-06-03 RX ORDER — HYDROXYZINE 50 MG/1
50 TABLET, FILM COATED ORAL 3 TIMES DAILY PRN
Qty: 30 TABLET | Refills: 0 | Status: SHIPPED | OUTPATIENT
Start: 2023-06-03 | End: 2023-06-13

## 2023-06-03 RX ORDER — PALIPERIDONE 9 MG/1
9 TABLET, EXTENDED RELEASE ORAL DAILY
Qty: 30 TABLET | Refills: 0 | Status: SHIPPED | OUTPATIENT
Start: 2023-06-04

## 2023-06-03 RX ORDER — DIVALPROEX SODIUM 250 MG/1
750 TABLET, EXTENDED RELEASE ORAL 2 TIMES DAILY
Qty: 180 TABLET | Refills: 0 | Status: SHIPPED | OUTPATIENT
Start: 2023-06-03

## 2023-06-03 RX ORDER — BENZTROPINE MESYLATE 2 MG/1
2 TABLET ORAL 2 TIMES DAILY
Qty: 60 TABLET | Refills: 0 | Status: SHIPPED | OUTPATIENT
Start: 2023-06-03

## 2023-06-03 RX ADMIN — BENZTROPINE MESYLATE 2 MG: 1 TABLET ORAL at 08:18

## 2023-06-03 RX ADMIN — DIVALPROEX SODIUM 750 MG: 500 TABLET, EXTENDED RELEASE ORAL at 08:17

## 2023-06-03 RX ADMIN — PALIPERIDONE 9 MG: 9 TABLET, EXTENDED RELEASE ORAL at 08:17

## 2023-06-03 NOTE — BH NOTE
Patient given tobacco quitline number 79808625165 at this time, refusing to call at this time, states \" I just dont want to quit now\"- patient given information as to the dangers of long term tobacco use.

## 2023-06-03 NOTE — PLAN OF CARE
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 Progressing  Note: 1:1 with pt x ten minutes. Pt encouraged to attend unit programming and interact with peers and staff. Pt also encouraged to tend to hygiene and ADLs. Pt encouraged to discuss feelings with staff and feedback and reassurance provided.        Patient utilizes music videos when possible as well as tv

## 2023-06-03 NOTE — DISCHARGE INSTR - DIET
Good nutrition is important when healing from an illness, injury, or surgery. Follow any nutrition recommendations given to you during your hospital stay. If you were given an oral nutrition supplement while in the hospital, continue to take this supplement at home. You can take it with meals, in-between meals, and/or before bedtime. These supplements can be purchased at most local grocery stores, pharmacies, and chain [employer]-stores. If you have any questions about your diet or nutrition, call the hospital and ask for the dietitian.   Continue to follow recommended diet

## 2023-06-03 NOTE — GROUP NOTE
Group Therapy Note    Date: 6/3/2023    Group Start Time: 5286  Group End Time: 1130  Group Topic: Psychoeducation    ADA TateS        Group Therapy Note    Attendees: 5/10     Patient's Goal:  To improve interpersonal skills and coping skills awareness through collaborating with peers and demonstrating self-expression. Notes:  Patient attended and participated in group. Patient was able to collaborate with peers and demonstrate self-expression. Patient was pleasant and cooperative. Status After Intervention:  Improved    Participation Level:  Active Listener and Interactive    Participation Quality: Appropriate, Attentive, and Sharing      Speech:  normal      Thought Process/Content: Logical and Linear      Affective Functioning: Congruent      Mood: euthymic      Level of consciousness:  Alert and Attentive      Response to Learning: Able to verbalize current knowledge/experience, Able to retain information, Capable of insight, and Progressing to goal      Endings: None Reported    Modes of Intervention: Support, Socialization, and Exploration      Discipline Responsible: Psychoeducational Specialist      Signature:  Soniya Zhang, [address]
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
## 2023-06-03 NOTE — BH NOTE
[facility]  Discharge Note    Pt discharged with followings belongings:   Dental Appliances: None  Vision - Corrective Lenses: None  Hearing Aid: None  Jewelry: None  Body Piercings Removed: N/A  Clothing: Footwear, Jacket/Coat, Pants, Shirt, Socks, Undergarments  Other Valuables: Other (Comment) (NA)   Valuables sent home with  patient. Patient discharges from unit with brother to stay with mom and kids. Patient educated on aftercare instructions: YES  Information faxed to [facility] by Staff  at [employer] [facility] .Patient verbalize understanding of AVS:    Status EXAM upon discharge:  Mental Status and Behavioral Exam  Normal: No  Level of Assistance: Independent/Self  Facial Expression: Brightened  Affect: Appropriate  Level of Consciousness: Alert  Frequency of Checks: 4 times per hour, close  Mood:Normal: Yes  Mood: Anxious  Motor Activity:Normal: Yes  Motor Activity: Agitated  Eye Contact: Good  Observed Behavior: Friendly, Cooperative  Sexual Misconduct History: Current - no  Preception: [place] to person, [place] to time, [place] to place, [place] to situation  Attention:Normal: Yes  Attention: Distractible  Thought Processes: Unremarkable  Thought Content:Normal: Yes  Thought Content: Preoccupations  Depression Symptoms: No problems reported or observed. Anxiety Symptoms: No problems reported or observed. Manuela Symptoms: No problems reported or observed.   Hallucinations: None  Delusions: No  Delusions: Paranoid  Memory:Normal: Yes  Memory: Confabulation  Insight and Judgment: Yes  Insight and Judgment: Poor insight    Tobacco Screening:  Practical Counseling, on admission, janay X, if applicable and completed (first 3 are required if patient doesn't refuse):            ( ) Recognizing danger situations (included triggers and roadblocks)                    ( ) Coping skills (new ways to manage stress,relaxation techniques, changing routine, distraction)

## 2023-06-03 NOTE — GROUP NOTE
Group Therapy Note    Date: 6/2/2023    Group Start Time: 2000  Group End Time: 2030  Group Topic: Wrap-Up    RAFI Harris        Group Therapy Note    Attendees: 8       Patient's Goal:  just keeping it together so I can go home tomorrow    Notes:  pt was quiet & verbal only when asked questions    Status After Intervention:  Improved    Participation Level: Minimal    Participation Quality: Attentive      Speech:  normal      Thought Process/Content: Logical      Affective Functioning: Congruent      Mood: anxious      Level of consciousness:  Alert, Oriented x4, and Attentive      Response to Learning: Capable of insight      Endings: None Reported    Modes of Intervention: Problem-solving      Discipline Responsible: [employer]      Signature:  Hardeep Harris

## 2023-06-03 NOTE — DISCHARGE INSTRUCTIONS
Information:  Medications:   Medication summary provided   I understand that I should take only the medications on my list.     -why and when I need to take each medicine.     -which side effects to watch for.     -that I should carry my medication information at all times in case of     Emergency situations. I will take all of my medicines to follow up appointments.     -check with my physician or pharmacist before taking any new    Medication, over the counter product or drink alcohol.    -Ask about food, drug or dietary supplement interactions.    -discard old lists and update records with medication providers. Notify Physician:  Notify physician if you notice:   Always call 911 if you feel your life is in danger  In case of an emergency call 911 immediately! If 911 is not available call your local emergency medical system for help    Behavioral Health Follow Up:  Original Referral Source:[facility]  Discharge Diagnosis: Acute psychosis ([facility] [address] [F23]  Recommendations for Level of Care: take all medication and attend all appointments as prescribed   Patient status at discharge: Stable  My hospital  was: [facility]   Aftercare plan faxed: [facility]   -faxed by: Staff   -date: 6/3/23   -time: 1500  Prescriptions: ***    Smoking: Quit Smoking. Call the [facility]'s smoking quitline at 7-538-44W-QUIT  Know the signs of a heart attack   If you have any of the following symptoms call 911 immediately, do not wait more    Than five minutes. 1. Pressure, fullness and/ or squeezing in the center of the chest spreading to    The jaw, neck or shoulder. 2. Chest discomfort with light headedness, fainting, sweating, nausea or    Shortness of breath. 3. Upper abdominal pressure or discomfort. 4. Lower chest pain, back pain, unusual fatigue, shortness of breath, nausea   Or dizziness.      General Information:   Questions regarding your bill: Call HELP program [phone]     Suicide Hotline

## 2023-06-05 NOTE — CARE COORDINATION
BERNY telephones patient at [phone] and lets him know that his [address] appt is scheduled for Tuesday June 6th at 11:00am with Darlina Boast at the [facility] office, Patient voices understanding of this.
Probate paperwork filed on this date via secure [employer] link
Follow Up Appointment: [facility]  [facility] [id number]    Follow up on 6/5/2023  You will be contacted with your followup appointment on Monday 6/5/2023.

## 2023-06-06 LAB
EKG ATRIAL RATE: 84 BPM
EKG P AXIS: 87 DEGREES
EKG P-R INTERVAL: 144 MS
EKG Q-T INTERVAL: 342 MS
EKG QRS DURATION: 80 MS
EKG QTC CALCULATION (BAZETT): 404 MS
EKG R AXIS: 83 DEGREES
EKG T AXIS: 75 DEGREES
EKG VENTRICULAR RATE: 84 BPM

## 2024-07-11 ENCOUNTER — HOSPITAL ENCOUNTER (EMERGENCY)
Age: 29
Discharge: HOME | End: 2024-07-11
Attending: EMERGENCY MEDICINE
Payer: COMMERCIAL

## 2024-07-11 VITALS
OXYGEN SATURATION: 96 % | TEMPERATURE: 96.6 F | RESPIRATION RATE: 16 BRPM | DIASTOLIC BLOOD PRESSURE: 72 MMHG | HEART RATE: 85 BPM | SYSTOLIC BLOOD PRESSURE: 119 MMHG

## 2024-07-11 DIAGNOSIS — K52.9 GASTROENTERITIS: ICD-10-CM

## 2024-07-11 DIAGNOSIS — R11.0 NAUSEA: Primary | ICD-10-CM

## 2024-07-11 PROCEDURE — 6370000000 HC RX 637 (ALT 250 FOR IP)

## 2024-07-11 PROCEDURE — 99283 EMERGENCY DEPT VISIT LOW MDM: CPT

## 2024-07-11 RX ORDER — FAMOTIDINE 20 MG/1
20 TABLET, FILM COATED ORAL ONCE
Status: COMPLETED | OUTPATIENT
Start: 2024-07-11 | End: 2024-07-11

## 2024-07-11 RX ORDER — FAMOTIDINE 20 MG/1
20 TABLET, FILM COATED ORAL 2 TIMES DAILY
Qty: 60 TABLET | Refills: 0 | Status: SHIPPED | OUTPATIENT
Start: 2024-07-11

## 2024-07-11 RX ORDER — ONDANSETRON 4 MG/1
4 TABLET, ORALLY DISINTEGRATING ORAL ONCE
Status: COMPLETED | OUTPATIENT
Start: 2024-07-11 | End: 2024-07-11

## 2024-07-11 RX ORDER — ONDANSETRON 4 MG/1
4 TABLET, ORALLY DISINTEGRATING ORAL 3 TIMES DAILY PRN
Qty: 21 TABLET | Refills: 0 | Status: SHIPPED | OUTPATIENT
Start: 2024-07-11

## 2024-07-11 RX ORDER — CALCIUM CARBONATE 500 MG/1
500 TABLET, CHEWABLE ORAL ONCE
Status: COMPLETED | OUTPATIENT
Start: 2024-07-11 | End: 2024-07-11

## 2024-07-11 RX ADMIN — FAMOTIDINE 20 MG: 20 TABLET, FILM COATED ORAL at 17:02

## 2024-07-11 RX ADMIN — ANTACID TABLETS 500 MG: 500 TABLET, CHEWABLE ORAL at 17:02

## 2024-07-11 RX ADMIN — ONDANSETRON 4 MG: 4 TABLET, ORALLY DISINTEGRATING ORAL at 17:02

## 2024-07-11 ASSESSMENT — PAIN SCALES - GENERAL: PAINLEVEL_OUTOF10: 6

## 2024-07-11 ASSESSMENT — PAIN - FUNCTIONAL ASSESSMENT: PAIN_FUNCTIONAL_ASSESSMENT: ACTIVITIES ARE NOT PREVENTED

## 2024-07-11 ASSESSMENT — PAIN DESCRIPTION - LOCATION: LOCATION: ABDOMEN

## 2024-07-11 ASSESSMENT — PAIN DESCRIPTION - PAIN TYPE: TYPE: ACUTE PAIN

## 2024-07-11 ASSESSMENT — PAIN DESCRIPTION - DESCRIPTORS: DESCRIPTORS: ACHING

## 2024-07-11 ASSESSMENT — LIFESTYLE VARIABLES
HOW MANY STANDARD DRINKS CONTAINING ALCOHOL DO YOU HAVE ON A TYPICAL DAY: PATIENT DOES NOT DRINK
HOW OFTEN DO YOU HAVE A DRINK CONTAINING ALCOHOL: NEVER

## 2024-07-11 NOTE — DISCHARGE INSTRUCTIONS
-You were seen and evaluated by emergency medicine physicians at [facility].    -Please follow-up with your primary care physician and/or with the referrals to specialist.    -You were diagnosed with: Gastroenteritis, nausea/vomiting    - You were treated symptomatically in the ED. You were discharged with medications to treat your symptoms. Please advance your diet as tolerated; start with clear liquids and advance to a regular diet.    -Please return to the Emergency Department if you are experiencing the following symptoms acutely: Headache, fever, chills, nausea, vomiting, chest pain, shortness of breath, abdominal pain, change with urination, change with bowel movements, change in your skin/hair/nail, weakness, fatigue, altered mental status and/or any change from baseline health.    -Thank you for coming to [facility].

## 2024-07-11 NOTE — ED PROVIDER NOTES
[facility] ED  Emergency Department Encounter  Emergency Medicine Resident     Pt Name:Darryl Morelos  MRN: [id number]  Birthdate 1995  Date of evaluation: 7/11/24  PCP:  Jamie Samuel MD  Note Started: 4:48 PM EDT      CHIEF COMPLAINT       Chief Complaint   Patient presents with    Abdominal Pain    Nausea       HISTORY OF PRESENT ILLNESS  (Location/Symptom, Timing/Onset, Context/Setting, Quality, Duration, Modifying Factors, Severity.)      Darryl Morelos is a 28-year-old male that presents to the ED with complaint of nausea and vomiting that started last night after he ate a baked potato.  Patient reports nonbloody nonbilious vomiting.  Patient has been able to keep down small amounts of food and water without any regurgitation.  Patient denies any sick contacts.  He has had regular bowel movements with no constipation or diarrhea.  No issues with urination, dysuria or hematuria.    Patient reports no headache, fever, chills, chest pain, shortness of breath, abdominal pain    PAST MEDICAL / SURGICAL / SOCIAL / FAMILY HISTORY      has no past medical history on file.       has no past surgical history on file.      Social History     Socioeconomic History    Marital status: Single     Spouse name: Not on file    Number of children: Not on file    Years of education: Not on file    Highest education level: Not on file   Occupational History    Not on file   Tobacco Use    Smoking status: Never    Smokeless tobacco: Not on file   Substance and Sexual Activity    Alcohol use: No    Drug use: No    Sexual activity: Not on file   Other Topics Concern    Not on file   Social History Narrative    Not on file     Social Determinants of Health     Financial Resource Strain: Not on file   Food Insecurity: Not on file   Transportation Needs: Not on file   Physical Activity: Not on file   Stress: Not on file   Social Connections: Not on file   Intimate Partner Violence: Not on file  as needed for Nausea or Vomiting, Disp-21 tablet, R-0Normal             Damián Crane DO  Emergency Medicine Resident    (Please note that portions of this note were completed with a voice recognition program.  Efforts were made to edit the dictations but occasionally words are mis-transcribed.)

## 2024-07-11 NOTE — ED PROVIDER NOTES
[facility]     Emergency Department     Faculty Attestation    I performed a history and physical examination of the patient and discussed management with the resident. I reviewed the resident’s note and agree with the documented findings including all diagnostic interpretations and plan of care. Any areas of disagreement are noted on the chart. I was personally present for the key portions of any procedures. I have documented in the chart those procedures where I was not present during the key portions. I have reviewed the emergency nurses triage note. I agree with the chief complaint, past medical history, past surgical history, allergies, medications, social and family history as documented unless otherwise noted below. Documentation of the HPI, Physical Exam and Medical Decision Making performed by scribalexys is based on my personal performance of the HPI, PE and MDM. For Physician Assistant/ Nurse Practitioner cases/documentation I have personally evaluated this patient and have completed at least one if not all key elements of the E/M (history, physical exam, and MDM). Additional findings are as noted.    Primary Care Physician: Jamie Samuel MD    Note Started: 6:32 PM EDT     VITAL SIGNS:   oral temperature is 96.6 °F (35.9 °C) (abnormal). His blood pressure is 119/72 and his pulse is 85. His respiration is 16 and oxygen saturation is 96%.      Medical Decision Making  Abdominal pain nausea vomiting little less than 12 hours.  Believes it may have been due to some food he ate last night.  No fevers.  On examination no acute distress abdomen is soft nontender nondistended.  No rebound no guarding no masses.  Impression suspect viral versus food poisoning etiology.  Symptomatic treatment.    Risk  OTC drugs.  Prescription drug management.          Stevenson Cain MD, FACEP, FAAEM  Attending Emergency Physician        Stevenson Cain,

## 2024-08-01 ENCOUNTER — HOSPITAL ENCOUNTER (INPATIENT)
Age: 29
LOS: 5 days | Discharge: HOME | DRG: 751 | End: 2024-08-07
Attending: STUDENT IN AN ORGANIZED HEALTH CARE EDUCATION/TRAINING PROGRAM | Admitting: PSYCHIATRY & NEUROLOGY
Payer: COMMERCIAL

## 2024-08-01 DIAGNOSIS — F30.10 MANIC BEHAVIOR (HCC): Primary | ICD-10-CM

## 2024-08-01 PROCEDURE — 99285 EMERGENCY DEPT VISIT HI MDM: CPT

## 2024-08-01 RX ORDER — DIPHENHYDRAMINE HYDROCHLORIDE 50 MG/ML
50 INJECTION INTRAMUSCULAR; INTRAVENOUS ONCE
Status: DISCONTINUED | OUTPATIENT
Start: 2024-08-01 | End: 2024-08-07 | Stop reason: HOSPADM

## 2024-08-01 RX ORDER — MIDAZOLAM HYDROCHLORIDE 1 MG/ML
4 INJECTION INTRAMUSCULAR; INTRAVENOUS ONCE
Status: DISCONTINUED | OUTPATIENT
Start: 2024-08-01 | End: 2024-08-07 | Stop reason: HOSPADM

## 2024-08-01 RX ORDER — HALOPERIDOL 5 MG/ML
5 INJECTION INTRAMUSCULAR ONCE
Status: DISCONTINUED | OUTPATIENT
Start: 2024-08-01 | End: 2024-08-07 | Stop reason: HOSPADM

## 2024-08-01 ASSESSMENT — LIFESTYLE VARIABLES: HOW OFTEN DO YOU HAVE A DRINK CONTAINING ALCOHOL: NEVER

## 2024-08-01 ASSESSMENT — PAIN - FUNCTIONAL ASSESSMENT: PAIN_FUNCTIONAL_ASSESSMENT: NONE - DENIES PAIN

## 2024-08-02 PROBLEM — F30.10 MANIC BEHAVIOR (HCC): Status: ACTIVE | Noted: 2024-08-02

## 2024-08-02 LAB
ALBUMIN SERPL-MCNC: 5.3 G/DL (ref 3.5–5.2)
ALP SERPL-CCNC: 72 U/L (ref 40–129)
ALT SERPL-CCNC: 31 U/L (ref 5–41)
AMPHET UR QL SCN: NEGATIVE
ANION GAP SERPL CALCULATED.3IONS-SCNC: 18 MMOL/L (ref 9–17)
AST SERPL-CCNC: 124 U/L
BARBITURATES UR QL SCN: NEGATIVE
BENZODIAZ UR QL: NEGATIVE
BILIRUB SERPL-MCNC: 2.1 MG/DL (ref 0.3–1.2)
BUN SERPL-MCNC: 12 MG/DL (ref 6–20)
CALCIUM SERPL-MCNC: 10.9 MG/DL (ref 8.6–10.4)
CANNABINOIDS UR QL SCN: POSITIVE
CHLORIDE SERPL-SCNC: 93 MMOL/L (ref 98–107)
CO2 SERPL-SCNC: 22 MMOL/L (ref 20–31)
COCAINE UR QL SCN: NEGATIVE
CREAT SERPL-MCNC: 1.1 MG/DL (ref 0.7–1.2)
ERYTHROCYTE [DISTWIDTH] IN BLOOD BY AUTOMATED COUNT: 13.2 % (ref 11.5–14.9)
ETHANOL PERCENT: <0.01 %
ETHANOLAMINE SERPL-MCNC: <10 MG/DL (ref 0–0.08)
FENTANYL UR QL: NEGATIVE
GFR, ESTIMATED: >90 ML/MIN/1.73M2
GLUCOSE SERPL-MCNC: 109 MG/DL (ref 70–99)
HAV IGM SERPL QL IA: NONREACTIVE
HBV CORE IGM SERPL QL IA: NONREACTIVE
HBV SURFACE AG SERPL QL IA: NONREACTIVE
HCT VFR BLD AUTO: 53.4 % (ref 41–53)
HCV AB SERPL QL IA: NONREACTIVE
HGB BLD-MCNC: 17.7 G/DL (ref 13.5–17.5)
MCH RBC QN AUTO: 32.1 PG (ref 26–34)
MCHC RBC AUTO-ENTMCNC: 33.1 G/DL (ref 31–37)
MCV RBC AUTO: 96.9 FL (ref 80–100)
METHADONE UR QL: NEGATIVE
OPIATES UR QL SCN: NEGATIVE
OXYCODONE UR QL SCN: NEGATIVE
PCP UR QL SCN: NEGATIVE
PLATELET # BLD AUTO: 326 K/UL (ref 150–450)
PMV BLD AUTO: 9.2 FL (ref 6–12)
POTASSIUM SERPL-SCNC: 3.6 MMOL/L (ref 3.7–5.3)
PROT SERPL-MCNC: 8.7 G/DL (ref 6.4–8.3)
RBC # BLD AUTO: 5.51 M/UL (ref 4.5–5.9)
SODIUM SERPL-SCNC: 133 MMOL/L (ref 135–144)
TEST INFORMATION: ABNORMAL
WBC OTHER # BLD: 16.2 K/UL (ref 3.5–11)

## 2024-08-02 PROCEDURE — APPSS60 APP SPLIT SHARED TIME 46-60 MINUTES: Performed by: NURSE PRACTITIONER

## 2024-08-02 PROCEDURE — 80307 DRUG TEST PRSMV CHEM ANLYZR: CPT

## 2024-08-02 PROCEDURE — 36415 COLL VENOUS BLD VENIPUNCTURE: CPT

## 2024-08-02 PROCEDURE — 80074 ACUTE HEPATITIS PANEL: CPT

## 2024-08-02 PROCEDURE — 6370000000 HC RX 637 (ALT 250 FOR IP): Performed by: PSYCHIATRY & NEUROLOGY

## 2024-08-02 PROCEDURE — 99222 1ST HOSP IP/OBS MODERATE 55: CPT | Performed by: PSYCHIATRY & NEUROLOGY

## 2024-08-02 PROCEDURE — 2040000000 HC PSYCH ICU R&B

## 2024-08-02 PROCEDURE — 85027 COMPLETE CBC AUTOMATED: CPT

## 2024-08-02 PROCEDURE — 80053 COMPREHEN METABOLIC PANEL: CPT

## 2024-08-02 PROCEDURE — 6360000002 HC RX W HCPCS: Performed by: PSYCHIATRY & NEUROLOGY

## 2024-08-02 PROCEDURE — 99223 1ST HOSP IP/OBS HIGH 75: CPT | Performed by: INTERNAL MEDICINE

## 2024-08-02 PROCEDURE — G0480 DRUG TEST DEF 1-7 CLASSES: HCPCS

## 2024-08-02 RX ORDER — POLYETHYLENE GLYCOL 3350 17 G/17G
17 POWDER, FOR SOLUTION ORAL DAILY PRN
Status: DISCONTINUED | OUTPATIENT
Start: 2024-08-02 | End: 2024-08-07 | Stop reason: HOSPADM

## 2024-08-02 RX ORDER — POTASSIUM CHLORIDE 20 MEQ/1
20 TABLET, EXTENDED RELEASE ORAL 2 TIMES DAILY WITH MEALS
Status: ACTIVE | OUTPATIENT
Start: 2024-08-02 | End: 2024-08-03

## 2024-08-02 RX ORDER — DIPHENHYDRAMINE HYDROCHLORIDE 50 MG/ML
50 INJECTION INTRAMUSCULAR; INTRAVENOUS EVERY 6 HOURS PRN
Status: DISCONTINUED | OUTPATIENT
Start: 2024-08-02 | End: 2024-08-07 | Stop reason: HOSPADM

## 2024-08-02 RX ORDER — TRAZODONE HYDROCHLORIDE 50 MG/1
50 TABLET ORAL NIGHTLY PRN
Status: DISCONTINUED | OUTPATIENT
Start: 2024-08-02 | End: 2024-08-07 | Stop reason: HOSPADM

## 2024-08-02 RX ORDER — PALIPERIDONE 3 MG/1
3 TABLET, EXTENDED RELEASE ORAL DAILY
Status: DISCONTINUED | OUTPATIENT
Start: 2024-08-02 | End: 2024-08-07 | Stop reason: HOSPADM

## 2024-08-02 RX ORDER — ACETAMINOPHEN 325 MG/1
650 TABLET ORAL EVERY 4 HOURS PRN
Status: DISCONTINUED | OUTPATIENT
Start: 2024-08-02 | End: 2024-08-07 | Stop reason: HOSPADM

## 2024-08-02 RX ORDER — HALOPERIDOL 5 MG/1
5 TABLET ORAL EVERY 6 HOURS PRN
Status: DISCONTINUED | OUTPATIENT
Start: 2024-08-02 | End: 2024-08-07 | Stop reason: HOSPADM

## 2024-08-02 RX ORDER — LORAZEPAM 2 MG/ML
2 INJECTION INTRAMUSCULAR EVERY 6 HOURS PRN
Status: DISCONTINUED | OUTPATIENT
Start: 2024-08-02 | End: 2024-08-07 | Stop reason: HOSPADM

## 2024-08-02 RX ORDER — POLYETHYLENE GLYCOL 3350 17 G
2 POWDER IN PACKET (EA) ORAL
Status: DISCONTINUED | OUTPATIENT
Start: 2024-08-02 | End: 2024-08-07 | Stop reason: HOSPADM

## 2024-08-02 RX ORDER — IBUPROFEN 400 MG/1
400 TABLET ORAL EVERY 6 HOURS PRN
Status: DISCONTINUED | OUTPATIENT
Start: 2024-08-02 | End: 2024-08-07 | Stop reason: HOSPADM

## 2024-08-02 RX ORDER — HYDROXYZINE 50 MG/1
50 TABLET, FILM COATED ORAL 3 TIMES DAILY PRN
Status: DISCONTINUED | OUTPATIENT
Start: 2024-08-02 | End: 2024-08-07 | Stop reason: HOSPADM

## 2024-08-02 RX ORDER — LORAZEPAM 1 MG/1
2 TABLET ORAL EVERY 6 HOURS PRN
Status: DISCONTINUED | OUTPATIENT
Start: 2024-08-02 | End: 2024-08-07 | Stop reason: HOSPADM

## 2024-08-02 RX ORDER — HALOPERIDOL 5 MG/ML
5 INJECTION INTRAMUSCULAR EVERY 6 HOURS PRN
Status: DISCONTINUED | OUTPATIENT
Start: 2024-08-02 | End: 2024-08-07 | Stop reason: HOSPADM

## 2024-08-02 RX ORDER — MAGNESIUM HYDROXIDE/ALUMINUM HYDROXICE/SIMETHICONE 120; 1200; 1200 MG/30ML; MG/30ML; MG/30ML
30 SUSPENSION ORAL EVERY 6 HOURS PRN
Status: DISCONTINUED | OUTPATIENT
Start: 2024-08-02 | End: 2024-08-07 | Stop reason: HOSPADM

## 2024-08-02 RX ORDER — DIVALPROEX SODIUM 500 MG/1
500 TABLET, EXTENDED RELEASE ORAL 2 TIMES DAILY
Status: DISCONTINUED | OUTPATIENT
Start: 2024-08-02 | End: 2024-08-07 | Stop reason: HOSPADM

## 2024-08-02 RX ADMIN — DIPHENHYDRAMINE HYDROCHLORIDE 50 MG: 50 INJECTION INTRAMUSCULAR; INTRAVENOUS at 19:18

## 2024-08-02 RX ADMIN — HALOPERIDOL LACTATE 5 MG: 5 INJECTION, SOLUTION INTRAMUSCULAR at 19:18

## 2024-08-02 RX ADMIN — PALIPERIDONE 3 MG: 3 TABLET, EXTENDED RELEASE ORAL at 21:43

## 2024-08-02 RX ADMIN — LORAZEPAM 2 MG: 2 INJECTION INTRAMUSCULAR; INTRAVENOUS at 19:18

## 2024-08-02 ASSESSMENT — PATIENT HEALTH QUESTIONNAIRE - PHQ9
2. FEELING DOWN, DEPRESSED OR HOPELESS: NOT AT ALL
SUM OF ALL RESPONSES TO PHQ QUESTIONS 1-9: 0
SUM OF ALL RESPONSES TO PHQ9 QUESTIONS 1 & 2: 0
SUM OF ALL RESPONSES TO PHQ QUESTIONS 1-9: 0
1. LITTLE INTEREST OR PLEASURE IN DOING THINGS: NOT AT ALL
SUM OF ALL RESPONSES TO PHQ QUESTIONS 1-9: 0
SUM OF ALL RESPONSES TO PHQ QUESTIONS 1-9: 0

## 2024-08-02 ASSESSMENT — LIFESTYLE VARIABLES
HOW MANY STANDARD DRINKS CONTAINING ALCOHOL DO YOU HAVE ON A TYPICAL DAY: PATIENT DOES NOT DRINK
HOW MANY STANDARD DRINKS CONTAINING ALCOHOL DO YOU HAVE ON A TYPICAL DAY: PATIENT UNABLE TO ANSWER
HOW OFTEN DO YOU HAVE A DRINK CONTAINING ALCOHOL: NEVER
HOW OFTEN DO YOU HAVE A DRINK CONTAINING ALCOHOL: PATIENT UNABLE TO ANSWER

## 2024-08-02 ASSESSMENT — SLEEP AND FATIGUE QUESTIONNAIRES
DO YOU HAVE DIFFICULTY SLEEPING: YES
DO YOU USE A SLEEP AID: NO
SLEEP PATTERN: DISTURBED/INTERRUPTED SLEEP
AVERAGE NUMBER OF SLEEP HOURS: 2

## 2024-08-02 NOTE — BH NOTE
Chest xray ordered. Spoke with radiology; will complete chest xray while off unit for liver ultrasound in morning.
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
## 2024-08-02 NOTE — ED NOTES
Mode of arrival (squad #, walk in, police, etc) : walk-in/ brought by sister        Chief complaint(s): mental health problem/ Psychiatric evaluation        Arrival Note (brief scenario, treatment PTA, etc).: Patient is brought by her sister for psychiatric evaluation. Patient is not answering questions, not verbal but just nods his head in response. The sister states the patient has not not been eating and sleeping for 3-4 days and pacing in the house. Sister states patient has been diagnosed with Psychosis a year and not been receiving his Invega injection. Patient denies SI and homicidal ideation by shaking his head.        C= \"Have you ever felt that you should Cut down on your drinking?\"  No  A= \"Have people Annoyed you by criticizing your drinking?\"  No  G= \"Have you ever felt bad or Guilty about your drinking?\"  No  E= \"Have you ever had a drink as an Eye-opener first thing in the morning to steady your nerves or to help a hangover?\"  Refused      Deferred []      Reason for deferring: N/A    *If yes to two or more: probable alcohol abuse.*

## 2024-08-02 NOTE — BH NOTE
Emergency PRN Medication Administration Note:      Patient is Agitated and Disruptive as evidence by standing by exit doors and going into other patient's rooms, and not following redirection from staff. Staff attempted to find and relieve the distress by Talking to patient, Refocusing on new activity, Offering suggestions, Checking for undiagnosed pain, and Administer PRN medications Patient is currently continuing to escalate. Medication Administered as prescribed: Ativan 2mg IM, Haldol 5mg IM, Benadryl 50mg IM. Patient Tolerated medication administration.   Will continue to monitor, offer support, and reassess.

## 2024-08-02 NOTE — ED NOTES
Provisional Diagnosis:     Patient presented to ED via family for a mental health evaluation.  Patient presents as acutely psychotic.    Psychosocial and Contextual Factors:     Patient has history of legal issues as it relates to his mental health.    C-SSRS Summary:    Patient denies    Patient: X  Family: X (sister)  Agency:     Substance Abuse  Patient admits to THC use.    Present Suicidal Behavior:    Patient denies    Verbal:     Attempt:    Past Suicidal Behavior:   Patient denies    Verbal:    Attempt:    Self-Injurious/Self-Mutilation:  Patient denies    Violence Current or Past   Patient was admitted approximately 1 year ago where his state of psychosis was such that he was paranoid and untrusting of all ED staff.  Patient was noted to clench fists and posture towards [facility] staff.  Patient needed medications for assistance with calming behaviors.  During this ED visit, patient is not combative or agitated, but he is untrusting with some ED staff.  Patient admits to being afraid of needles and is somewhat uncooperative with blood draw.    Trauma Identified:    None reported to this writer    Protective Factors:    Patient has housing.  Patient has support.  Patient has insurance.  Patient has employment.  Patient has 3 young children.    Risk Factors:    Patient not linked.  Patient not taking any mental health medications.    Clinical Summary:    Patient is a 29 year old  male who presented to ED via family for a mental health evaluation.  Patient presents in a state of acute psychosis.  Patient reportedly has not slept in 3-4 days.  In addition to not sleeping, patient has not been eating food of any substance.  Patient admits to only eating fruit because all he needs is \"fruit, water and weed\".  Patient does admit to THC use.  Patient denies any SI or HI.  Patient denies any hallucinations but he has been observed responding to internal stimuli.  Patient is also observed to be experiencing

## 2024-08-02 NOTE — ED PROVIDER NOTES
that \"every time I get my life together, somebody screws with it\".  Although he is unable to identify to me or social work or to sister who exactly is \"screwed with his life\".  He has not slept for over 4 days.  Sister states that he is pacing around the house continuously.  Was gone from the home today for about 8 hours and was thought to be walking around outside although patient states that he was just outside the house, sister states they could not find him anywhere.  He states that he has been eating and drinking normally sister states that he has not eaten anything solid for 4 days and at least and has not been eating and drinking normally otherwise.  She states that this is exactly how he presented the last episode this patient seems rather fixated on several different ideas.  He states occasionally that he has a phone that he does not have a phone.  He states he is doing fine at work however his sister reports that he has become increasingly labile at work.  He states that he has not had any episodes where he has become aggressive but his sister states that yesterday when he was driving home from work he got out of the car and walked around in traffic for approximately 10 to 15 minutes before his girlfriend was able to move the car out of traffic.        Pertinent Comorbid Conditions:  See history above.        EMERGENCY DEPARTMENT COURSE:  MDM:    2)  Data Reviewed    Decision Rules, Testing considered, external document review, independent imaging review:  Will order [facility] labs      Diagnoses Considered but Do Not Suspect:         3)  Treatment and Disposition          Patient repeat assessment, shared decision making, and disposition discussion: [facility] for [facility]al          MIPS:  [None]    Social determinants of health impacting treatment or disposition:  none    Code Status Discussion:  Full Code      PROCEDURES:  none    CONSULTS:  None    CRITICAL CARE:  There was a high probability of clinically

## 2024-08-02 NOTE — H&P
Department of Psychiatry  Attending Physician Psychiatric Assessment     Reason for Admission to Psychiatric Unit:  Acute disordered/bizarre behavior or psychomotor agitation or retardation;interferes with ADLs so that patient cannot function at a less intensive care level of care during evaluation and treatment   A mental disorder causing major disability in social, interpersonal, occupational, and/or educational functioning that is leading to dangerous or life-threatening functioning, and that can only be addressed in an acute inpatient setting   A mental disorder that causes an inability to maintain adequate nurtrition or self-care, and family/community support cannot provide reliable, essential care, so that the patient cannont function at a less intensive level of care during evaluation and treatment   Concerns about patient's safety in the community    CHIEF COMPLAINT:  Acute psychosis    History obtained from: Patient, electronic medical record          HISTORY OF PRESENT ILLNESS:    Darryl Morelos is a 29 y.o. male who has a past medical history of psychosis. Patient presented to the ED, per [facility] documentation, Patient is a 29 year old  male who presented to ED via family for a mental health evaluation.  Patient presents in a state of acute psychosis.  Patient reportedly has not slept in 3-4 days.  In addition to not sleeping, patient has not been eating food of any substance.  Patient admits to only eating fruit because all he needs is \"fruit, water and weed\".  Patient does admit to THC use.  Patient denies any SI or HI.  Patient denies any hallucinations but he has been observed responding to internal stimuli.  Patient is also observed to be experiencing moments of selective mutism.  Patient initially refused to speak at all during triage, but did later speak with this writer, RN and ED physician.  Patient does present with tangential speech, stating that he \"just wants to get my life 
  IN-PATIENT SERVICE  [facility] Internal Medicine  [facility] Internal Medicine   Hernan Salmeron MD; Alvin Rivera MD; Kunal Marion MD; Garret Narvaez MD,   Portia Anderson MD; Aster Roberts MD; Tamiko Oliveira MD; Chas Merchant MD; Pascual Lindsey MD    HISTORY AND PHYSICAL EXAMINATION/ CONSULT NOTE            Date:   8/2/2024  Patient name:  Darryl Morelos  Date of admission:  8/1/2024 11:08 PM  MRN:   [id number]  Account:  [id number]  YOB: 1995  PCP:    No primary care provider on file.  Room:   [address] [place] [address]  Code Status:    Full Code    Physician Requesting Consult: Alexys Joseph,*    Reason for Consult: History and physical, medical management    Chief Complaint:     Chief Complaint   Patient presents with   • Psychiatric Evaluation   • Mental Health Problem     Medical management    History Obtained From:     Patient, EMR, nursing staff    History of Present Illness:     29-year-old male admitted for acute psychosis  No significant medical history  Patient denies any chest pain palpitation cough difficulty breathing nausea vomiting diarrhea or urinary symptoms      Past Medical History:     Past Medical History:   Diagnosis Date   • Psychiatric problem         Past Surgical History:     History reviewed. No pertinent surgical history.     Medications Prior to Admission:     Prior to Admission medications    Medication Sig Start Date End Date Taking? Authorizing Provider   famotidine (PEPCID) 20 MG tablet Take 1 tablet by mouth 2 times daily  Patient not taking: Reported on 8/2/2024 7/11/24   Damián Crane,    ondansetron (ZOFRAN-ODT) 4 MG disintegrating tablet Take 1 tablet by mouth 3 times daily as needed for Nausea or Vomiting  Patient not taking: Reported on 8/2/2024 7/11/24   Damián Crane, DO   benztropine (COGENTIN) 2 MG tablet Take 1 tablet by mouth 2 times daily  Patient not taking: Reported on 8/2/2024 6/3/23   Sonya Garcia APRN 
no

## 2024-08-03 ENCOUNTER — APPOINTMENT (OUTPATIENT)
Dept: ULTRASOUND IMAGING | Age: 29
DRG: 751 | End: 2024-08-03
Payer: COMMERCIAL

## 2024-08-03 LAB
ALBUMIN SERPL-MCNC: 4.4 G/DL (ref 3.5–5.2)
ALP SERPL-CCNC: 58 U/L (ref 40–129)
ALT SERPL-CCNC: 40 U/L (ref 5–41)
ANION GAP SERPL CALCULATED.3IONS-SCNC: 18 MMOL/L (ref 9–17)
AST SERPL-CCNC: 113 U/L
BILIRUB DIRECT SERPL-MCNC: 0.3 MG/DL
BILIRUB SERPL-MCNC: 1.3 MG/DL (ref 0.3–1.2)
BUN SERPL-MCNC: 8 MG/DL (ref 6–20)
CALCIUM SERPL-MCNC: 9.5 MG/DL (ref 8.6–10.4)
CHLORIDE SERPL-SCNC: 98 MMOL/L (ref 98–107)
CHOLEST SERPL-MCNC: 117 MG/DL
CHOLESTEROL/HDL RATIO: 2.5
CO2 SERPL-SCNC: 21 MMOL/L (ref 20–31)
CREAT SERPL-MCNC: 0.9 MG/DL (ref 0.7–1.2)
EST. AVERAGE GLUCOSE BLD GHB EST-MCNC: 103 MG/DL
GFR, ESTIMATED: >90 ML/MIN/1.73M2
GLUCOSE SERPL-MCNC: 88 MG/DL (ref 70–99)
HBA1C MFR BLD: 5.2 % (ref 4–6)
HDLC SERPL-MCNC: 47 MG/DL
LDLC SERPL CALC-MCNC: 59 MG/DL (ref 0–130)
MAGNESIUM SERPL-MCNC: 2.2 MG/DL (ref 1.6–2.6)
POTASSIUM SERPL-SCNC: 3.4 MMOL/L (ref 3.7–5.3)
PROT SERPL-MCNC: 7.3 G/DL (ref 6.4–8.3)
SODIUM SERPL-SCNC: 137 MMOL/L (ref 135–144)
TRIGL SERPL-MCNC: 55 MG/DL

## 2024-08-03 PROCEDURE — 2040000000 HC PSYCH ICU R&B

## 2024-08-03 PROCEDURE — 80061 LIPID PANEL: CPT

## 2024-08-03 PROCEDURE — 82248 BILIRUBIN DIRECT: CPT

## 2024-08-03 PROCEDURE — 83036 HEMOGLOBIN GLYCOSYLATED A1C: CPT

## 2024-08-03 PROCEDURE — APPSS30 APP SPLIT SHARED TIME 16-30 MINUTES: Performed by: NURSE PRACTITIONER

## 2024-08-03 PROCEDURE — 6370000000 HC RX 637 (ALT 250 FOR IP): Performed by: PSYCHIATRY & NEUROLOGY

## 2024-08-03 PROCEDURE — 36415 COLL VENOUS BLD VENIPUNCTURE: CPT

## 2024-08-03 PROCEDURE — 80053 COMPREHEN METABOLIC PANEL: CPT

## 2024-08-03 PROCEDURE — 83735 ASSAY OF MAGNESIUM: CPT

## 2024-08-03 PROCEDURE — 99232 SBSQ HOSP IP/OBS MODERATE 35: CPT | Performed by: PSYCHIATRY & NEUROLOGY

## 2024-08-03 RX ADMIN — LORAZEPAM 2 MG: 1 TABLET ORAL at 18:37

## 2024-08-03 RX ADMIN — HALOPERIDOL 5 MG: 5 TABLET ORAL at 18:37

## 2024-08-03 NOTE — BH NOTE
Patient refused medications offered as ordered, instructed on action and purpose and patient continues to decline.

## 2024-08-03 NOTE — BH NOTE
PRN Medication Follow-Up Note:    PRN medication of Ativan 2mg IM, Haldol 5mg IM, and Benadryl 50mg IM was effective as evidence by patient regaining behavioral control, resting, and is no longer wandering into other patients rooms.

## 2024-08-03 NOTE — BH NOTE
Emergency PRN Medication Administration Note:      Patient is Agitated and Disruptive as evidence by increasingly irritable on the phone, yelling, slamming phone down, then walking and punching wall and door. Staff attempted to find and relieve the distress by Talking to patient, Refocusing on new activity, and Offering suggestions Patient is currently  pacing the unit. Patient accepted PRN medications. Medication Administered as prescribed Haldol 5mg, Ativan 2mg orally. Patient Tolerated medication administration.   Will continue to monitor, offer support, and reassess.

## 2024-08-03 NOTE — BH NOTE
Per Dr. KRYSTA cabrera to hold patient's Depakote ER 500mg PO due to elevated liver labs and liver ultrasound tomorrow.

## 2024-08-03 NOTE — BH NOTE
Writer informed patient of purpose of testing ordered, and that staff would assist transport staff in transportation process. Patient continues to refused at this time.

## 2024-08-04 LAB
ALBUMIN SERPL-MCNC: 4.9 G/DL (ref 3.5–5.2)
ALP SERPL-CCNC: 62 U/L (ref 40–129)
ALT SERPL-CCNC: 50 U/L (ref 5–41)
AST SERPL-CCNC: 113 U/L
BASOPHILS # BLD: 0 K/UL (ref 0–0.2)
BASOPHILS NFR BLD: 0 % (ref 0–2)
BILIRUB DIRECT SERPL-MCNC: 0.3 MG/DL
BILIRUB INDIRECT SERPL-MCNC: 0.8 MG/DL (ref 0–1)
BILIRUB SERPL-MCNC: 1.1 MG/DL (ref 0.3–1.2)
EOSINOPHIL # BLD: 0.1 K/UL (ref 0–0.4)
EOSINOPHILS RELATIVE PERCENT: 1 % (ref 0–4)
ERYTHROCYTE [DISTWIDTH] IN BLOOD BY AUTOMATED COUNT: 13.4 % (ref 11.5–14.9)
HCT VFR BLD AUTO: 50.5 % (ref 41–53)
HGB BLD-MCNC: 16.6 G/DL (ref 13.5–17.5)
LYMPHOCYTES NFR BLD: 2.3 K/UL (ref 1–4.8)
LYMPHOCYTES RELATIVE PERCENT: 29 % (ref 24–44)
MCH RBC QN AUTO: 32.1 PG (ref 26–34)
MCHC RBC AUTO-ENTMCNC: 32.9 G/DL (ref 31–37)
MCV RBC AUTO: 97.5 FL (ref 80–100)
MONOCYTES NFR BLD: 0.8 K/UL (ref 0.1–1.3)
MONOCYTES NFR BLD: 10 % (ref 1–7)
NEUTROPHILS NFR BLD: 60 % (ref 36–66)
NEUTS SEG NFR BLD: 4.8 K/UL (ref 1.3–9.1)
PLATELET # BLD AUTO: 277 K/UL (ref 150–450)
PMV BLD AUTO: 9 FL (ref 6–12)
PROT SERPL-MCNC: 7.6 G/DL (ref 6.4–8.3)
RBC # BLD AUTO: 5.18 M/UL (ref 4.5–5.9)
WBC OTHER # BLD: 8 K/UL (ref 3.5–11)

## 2024-08-04 PROCEDURE — 93005 ELECTROCARDIOGRAM TRACING: CPT | Performed by: INTERNAL MEDICINE

## 2024-08-04 PROCEDURE — 6370000000 HC RX 637 (ALT 250 FOR IP): Performed by: PSYCHIATRY & NEUROLOGY

## 2024-08-04 PROCEDURE — 99231 SBSQ HOSP IP/OBS SF/LOW 25: CPT | Performed by: INTERNAL MEDICINE

## 2024-08-04 PROCEDURE — 2040000000 HC PSYCH ICU R&B

## 2024-08-04 PROCEDURE — 36415 COLL VENOUS BLD VENIPUNCTURE: CPT

## 2024-08-04 PROCEDURE — 99232 SBSQ HOSP IP/OBS MODERATE 35: CPT | Performed by: PSYCHIATRY & NEUROLOGY

## 2024-08-04 PROCEDURE — 85025 COMPLETE CBC W/AUTO DIFF WBC: CPT

## 2024-08-04 PROCEDURE — APPSS30 APP SPLIT SHARED TIME 16-30 MINUTES: Performed by: NURSE PRACTITIONER

## 2024-08-04 PROCEDURE — 80076 HEPATIC FUNCTION PANEL: CPT

## 2024-08-04 RX ADMIN — HYDROXYZINE HYDROCHLORIDE 50 MG: 50 TABLET, FILM COATED ORAL at 13:56

## 2024-08-04 RX ADMIN — ACETAMINOPHEN 650 MG: 325 TABLET ORAL at 15:04

## 2024-08-04 RX ADMIN — PALIPERIDONE 3 MG: 3 TABLET, EXTENDED RELEASE ORAL at 08:21

## 2024-08-04 RX ADMIN — DIVALPROEX SODIUM 500 MG: 500 TABLET, FILM COATED, EXTENDED RELEASE ORAL at 08:21

## 2024-08-04 RX ADMIN — DIVALPROEX SODIUM 500 MG: 500 TABLET, FILM COATED, EXTENDED RELEASE ORAL at 20:33

## 2024-08-04 RX ADMIN — IBUPROFEN 400 MG: 400 TABLET, FILM COATED ORAL at 13:56

## 2024-08-04 ASSESSMENT — PAIN SCALES - GENERAL
PAINLEVEL_OUTOF10: 6
PAINLEVEL_OUTOF10: 0
PAINLEVEL_OUTOF10: 6

## 2024-08-04 ASSESSMENT — PAIN - FUNCTIONAL ASSESSMENT: PAIN_FUNCTIONAL_ASSESSMENT: ACTIVITIES ARE NOT PREVENTED

## 2024-08-04 ASSESSMENT — PAIN DESCRIPTION - LOCATION
LOCATION: TEETH
LOCATION: TEETH

## 2024-08-04 ASSESSMENT — PAIN DESCRIPTION - DESCRIPTORS: DESCRIPTORS: DISCOMFORT

## 2024-08-04 ASSESSMENT — PAIN DESCRIPTION - ORIENTATION: ORIENTATION: UPPER;LEFT

## 2024-08-04 NOTE — BH NOTE
Patient was witnessed by writer gagging in his sink, and spitting. Patient stated that he does this when his tooth starts to act up. Administered atarax for nausea and ibuprofen for pain. Patient stated that he has had tooth pain for over a year. Will continue to monitor symptoms.

## 2024-08-04 NOTE — BH NOTE
Patient Has been tachycardic. Dr. Narvaez has been made aware. Awaiting reply.    Dr. Merchant has ordered EKG.

## 2024-08-04 NOTE — BH NOTE
Patient states the ibuprofen has help with his tooth pain. Patient states he would do the xray tomorrow.

## 2024-08-04 NOTE — GROUP NOTE
Group Therapy Note    Date: 8/4/2024    Group Start Time: 1300  Group End Time: 1400  Group Topic: Activity    [facility] L[facility]        Group Therapy Note    Attendees: 2/6       Patient's Goal:  Was to ease anxiety and thoughts.     Notes:  Patient actively participated in activity group. Patient walked with writer, doing breathing techniques to reduce anxiety and stretches.     Status After Intervention:  Improved    Participation Level: Active Listener and Interactive    Participation Quality: Appropriate and Attentive      Speech:  hesitant      Thought Process/Content: Flight of ideas      Affective Functioning: Blunted      Mood: anxious      Level of consciousness:  Alert      Response to Learning: Able to verbalize current knowledge/experience and Capable of insight      Endings: None Reported    Modes of Intervention: Activity      Discipline Responsible: Licensed Practical Nurse      Signature:  Tamra Wilson LPN

## 2024-08-04 NOTE — BH NOTE
Dr. Merchant notified of the tooth pain. Awaiting a response      Dr. Merchant responded to monitor.

## 2024-08-04 NOTE — BH NOTE
Emergency PRN Medication Follow-Up Note:    PRN medication of Haldol 5 mg, Ativan 2mg PO was effective as evidenced by patient regained behavioral control. Patient denies medication side effects. Will continue to monitor and provide support as needed.

## 2024-08-05 LAB
EKG ATRIAL RATE: 91 BPM
EKG P AXIS: 82 DEGREES
EKG P-R INTERVAL: 128 MS
EKG Q-T INTERVAL: 348 MS
EKG QRS DURATION: 82 MS
EKG QTC CALCULATION (BAZETT): 428 MS
EKG R AXIS: 80 DEGREES
EKG T AXIS: 73 DEGREES
EKG VENTRICULAR RATE: 91 BPM

## 2024-08-05 PROCEDURE — APPSS30 APP SPLIT SHARED TIME 16-30 MINUTES: Performed by: NURSE PRACTITIONER

## 2024-08-05 PROCEDURE — 99232 SBSQ HOSP IP/OBS MODERATE 35: CPT | Performed by: PSYCHIATRY & NEUROLOGY

## 2024-08-05 PROCEDURE — 93010 ELECTROCARDIOGRAM REPORT: CPT | Performed by: INTERNAL MEDICINE

## 2024-08-05 PROCEDURE — 6370000000 HC RX 637 (ALT 250 FOR IP): Performed by: PSYCHIATRY & NEUROLOGY

## 2024-08-05 PROCEDURE — 2040000000 HC PSYCH ICU R&B

## 2024-08-05 RX ADMIN — DIVALPROEX SODIUM 500 MG: 500 TABLET, FILM COATED, EXTENDED RELEASE ORAL at 21:00

## 2024-08-05 RX ADMIN — PALIPERIDONE 3 MG: 3 TABLET, EXTENDED RELEASE ORAL at 07:34

## 2024-08-05 RX ADMIN — DIVALPROEX SODIUM 500 MG: 500 TABLET, FILM COATED, EXTENDED RELEASE ORAL at 07:34

## 2024-08-05 NOTE — BH NOTE
Per x-ray, x-ray for patient's mandible was changed to a CT. When asked if patient wanted CT scan, patient reported that he would rather get the scan done outpatient after leaving the [facility]. CT was informed, internal med notified.

## 2024-08-05 NOTE — GROUP NOTE
Group Therapy Note    Date: 8/5/2024    Group Start Time: 0900  Group End Time: 0920  Group Topic: Community Meeting    Crystal Webber        Group Therapy Note    Attendees: 4/6       Patient's Goal:  \"Keep peace and go home if I can\"    Notes:  Goal setting    Status After Intervention:  Improved    Participation Level: Interactive    Participation Quality: Appropriate      Speech:  normal      Thought Process/Content: Flight of ideas      Affective Functioning: Congruent      Mood: anxious      Level of consciousness:  Alert      Response to Learning: Able to verbalize current knowledge/experience      Endings: None Reported    Modes of Intervention: Education, Support, Socialization, and Exploration      Discipline Responsible: Behavorial Health Tech      Signature:  Crystal Doherty

## 2024-08-06 PROCEDURE — APPSS30 APP SPLIT SHARED TIME 16-30 MINUTES

## 2024-08-06 PROCEDURE — 6370000000 HC RX 637 (ALT 250 FOR IP): Performed by: PSYCHIATRY & NEUROLOGY

## 2024-08-06 PROCEDURE — 99232 SBSQ HOSP IP/OBS MODERATE 35: CPT | Performed by: PSYCHIATRY & NEUROLOGY

## 2024-08-06 PROCEDURE — 2040000000 HC PSYCH ICU R&B

## 2024-08-06 RX ADMIN — DIVALPROEX SODIUM 500 MG: 500 TABLET, FILM COATED, EXTENDED RELEASE ORAL at 20:35

## 2024-08-06 RX ADMIN — DIVALPROEX SODIUM 500 MG: 500 TABLET, FILM COATED, EXTENDED RELEASE ORAL at 08:11

## 2024-08-06 RX ADMIN — PALIPERIDONE 3 MG: 3 TABLET, EXTENDED RELEASE ORAL at 08:11

## 2024-08-06 ASSESSMENT — LIFESTYLE VARIABLES
HOW MANY STANDARD DRINKS CONTAINING ALCOHOL DO YOU HAVE ON A TYPICAL DAY: PATIENT DECLINED
HOW OFTEN DO YOU HAVE A DRINK CONTAINING ALCOHOL: PATIENT DECLINED

## 2024-08-06 ASSESSMENT — PAIN SCALES - GENERAL: PAINLEVEL_OUTOF10: 0

## 2024-08-06 NOTE — PROGRESS NOTES
Behavioral Services  Medicare Certification Upon Admission    I certify that this patient's inpatient psychiatric hospital admission is medically necessary for:    [x] (1) Treatment which could reasonably be expected to improve this patient's condition,       [x] (2) Or for diagnostic study;     AND     [x](2) The inpatient psychiatric services are provided while the individual is under the care of a physician and are included in the individualized plan of care.    Estimated length of stay/service 3-5 days    Plan for post-hospital care hc    Electronically signed by Alexys Joseph MD on 8/2/2024 at 7:10 PM      
  Daily Progress Note  8/3/2024    Patient Name: Darryl Morelos    CHIEF COMPLAINT:  Acute psychosis          SUBJECTIVE:      Patient is seen today for a follow up assessment.  He is found resting in bed, assessment conducted in patient's private room with the door open.  He reports feeling \"better\" however upon inquiring in what way feels better he does not answer.  Appears suspicious and visibly anxious.  He does not answer other questions and blankly stares at the writer throughout remainder of the encounter, due to lack of cooperation, interview was terminated.  Per unit nursing staff he is witnessed talking to himself, appears to be responding to internal stimuli.  Refused Invega and Depakote today.  Received IM Haldol, Ativan, and Benadryl last evening at 1918, per nursing documentation he was going into other patient's rooms, not able to be redirected.  Blood pressure noted to be elevated at 145/90, pulse 108.  Repeat BMP with low potassium 3.4, down from 3.6.  Internal medicine ordered liver ultrasound for further evaluation of transaminitis, patient refusing imaging.        Appetite:  [] Adequate/Unchanged  [] Increased  [x] Decreased      Sleep:       [] Adequate/Unchanged  [] Fair  [x] Poor      Group Attendance on Unit:   [] Yes   [] Selectively    [x] No    Compliant with scheduled medications: [] Yes  [x] No    Received emergency medications in past 24 hrs: [x] Yes   [] No    Medication Side Effects: Denies         Mental Status Exam  Level of consciousness: Alert and awake   Appearance: Appropriate attire for setting, resting in bed, with poor grooming and hygiene   Behavior/Motor: Guarded, appears suspicious, difficult to engage, no psychomotor abnormalities noted  Attitude toward examiner: Uncooperative, intense eye contact  Speech: loud, pressured  Mood:  \"better\"  Affect: visibly anxious, bizarre  Thought processes: unable to assess  Thought content: homicidal ideation: unable to 
  Daily Progress Note  8/5/2024    Patient Name: Darryl Morelos    CHIEF COMPLAINT: Acute psychosis         SUBJECTIVE:    Patient is seen today for a follow up assessment.     Staff reports that patient has been triggered by telephone calls, he has been angry, has moments of anger regarding discharge however has been able to maintain behavioral control without emergency medication over the past 24 hours.  He is taking his Invega and Depakote.  He is observed ambulating around the unit today, he is agreeable to engaging in an interview.  He reports that he is \"doing great\".  He is very minimizing of symptoms and resistant to discussing a events that led to hospitalization.  Attempted to explore his insight into his ability to maintain behavioral control over his emotions, he denies that he has any difficulty with that.  He is very guarded when attempting to elicit any information regarding emotional instability or impulsivity.    Appetite:  [x] Adequate/Unchanged  [] Increased  [] Decreased      Sleep:       [x] Adequate/Unchanged  [] Fair  [] Poor      Group Attendance on Unit:   [] Yes   [] Selectively    [x] No    Compliant with scheduled medications: [x] Yes  [] No    Received emergency medications in past 24 hrs: [] Yes   [x] No    Medication Side Effects: Denies         Mental Status Exam  Level of consciousness: Alert and awake   Appearance: Appropriate attire for setting, standing, with fair  grooming and hygiene   Behavior/Motor: Approachable, engages with interviewer   Attitude toward examiner: Mostly cooperative, attentive, good eye contact  Speech: Normal rate and volume, elevated tone  Mood: \"I am great\"  Affect: Incongruent, guarded, somewhat suspicious  Thought processes: Mostly linear and coherent  Thought content: Minimizing, Denies homicidal ideation  Suicidal Ideation: Denies suicidal ideations, contracts for safety on the unit.   Delusions: Paranoid  Perceptual Disturbance:  Patient does not 
  IN-PATIENT SERVICE  [facility] Internal Medicine  [facility] Internal Medicine   Hernan Salmeron MD; Alvin Rivera MD; Kunal Marion MD; Garret Narvaez MD,   Portia Anderson MD; Aster Roberts MD; Tamiko Oliveira MD; Chas Merchant MD; Pascual Lindsey MD    HISTORY AND PHYSICAL EXAMINATION/ CONSULT NOTE            Date:   8/4/2024  Patient name:  Darryl Morelos  Date of admission:  8/1/2024 11:08 PM  MRN:   [id number]  Account:  [id number]  YOB: 1995  PCP:    No primary care provider on file.  Room:   [address] [place] [address]  Code Status:    Full Code    Physician Requesting Consult: Alexys Joseph,*    Reason for Consult: History and physical, medical management    Chief Complaint:     Chief Complaint   Patient presents with    Psychiatric Evaluation    Mental Health Problem     Medical management    History Obtained From:     Patient, EMR, nursing staff    History of Present Illness:     29-year-old male admitted for acute psychosis  No significant medical history  Patient denies any chest pain palpitation cough difficulty breathing nausea vomiting diarrhea or urinary symptoms    8/4/2024  Patient seen, denies any acute concerns.      Past Medical History:     Past Medical History:   Diagnosis Date    Psychiatric problem         Past Surgical History:     History reviewed. No pertinent surgical history.     Medications Prior to Admission:     Prior to Admission medications    Medication Sig Start Date End Date Taking? Authorizing Provider   famotidine (PEPCID) 20 MG tablet Take 1 tablet by mouth 2 times daily  Patient not taking: Reported on 8/2/2024 7/11/24   Damián Crane DO   ondansetron (ZOFRAN-ODT) 4 MG disintegrating tablet Take 1 tablet by mouth 3 times daily as needed for Nausea or Vomiting  Patient not taking: Reported on 8/2/2024 7/11/24   Damián Crane DO   benztropine (COGENTIN) 2 MG tablet Take 1 tablet by mouth 2 times daily  Patient not taking: 
 [facility]  Admission Note     Admission Type:   Admission Type: Involuntary    Reason for admission:  Reason for Admission: poor sleep, increased paranoia, poor med compliance, not caring for 3 children      Addictive Behavior:   Addictive Behavior  In the Past 3 Months, Have You Felt or Has Someone Told You That You Have a Problem With  : None    Medical Problems:   Past Medical History:   Diagnosis Date    Psychiatric problem        Status EXAM:  Mental Status and Behavioral Exam  Normal: No  Level of Assistance: Independent/Self  Facial Expression: Worried, Hostile  Affect: Unstable  Level of Consciousness: Alert  Frequency of Checks: 4 times per hour, close  Mood:Normal: No  Mood: Anxious, Labile, Suspicious, Angry, Irritable  Motor Activity:Normal: Yes  Eye Contact: Good  Observed Behavior: Aggressive, Agitated, Hostile, Impulsive, Cooperative, Preoccupied  Sexual Misconduct History: Current - no  Preception:  (oriented x4)  Attention:Normal: No  Attention: Distractible, Unable to concentrate  Thought Processes: Blocking, Flight of ideas  Thought Content:Normal: No  Thought Content: Delusions, Paranoia, Preoccupations  Depression Symptoms: Feelings of helplessness, Impaired concentration  Anxiety Symptoms: Generalized  Manuela Symptoms: Flight of ideas, Labile, Poor judgment, Pressured speech  Hallucinations: None  Delusions: Yes  Delusions: Paranoid  Memory:Normal: No  Memory: Confabulation  Insight and Judgment: No  Insight and Judgment: Poor judgment, Poor insight, Unrealistic    Tobacco Screening:  Practical Counseling, on admission, janay X, if applicable and completed (first 3 are required if patient doesn't refuse):            ( ) Recognizing danger situations (included triggers and roadblocks)                    ( ) Coping skills (new ways to manage stress,relaxation techniques, changing routine, distraction)                                                           ( ) Basic information 
I independently saw and evaluated the patient.  I reviewed the  documentation above.  Any additional comments or changes to the    documentation are stated below otherwise agree with assessment.      The patient was seen face-to-face.  He is irritable and discharge focused.  He is upset that he is not being released today.  He says he has to to take care of his children and wants to go home.  He is difficult to redirect and became agitated when he realized he was not being discharged.  He acknowledges using marijuana on a daily basis.  He does not feel that as a problem.  The patient does have a previous history of admission to psychiatry and psychotic symptoms he denies that he was not sleeping well leading up to admission.  The patient has required as needed medications yesterday evening.  He has taken Invega last night but not this morning.    PLAN  Medications as noted above  Attempt to develop insight  Psycho-education conducted.  Supportive Therapy conducted.  Follow-up daily while on inpatient unit    Electronically signed by POLA ESPINOSA MD on 8/3/24 at 1:07 PM EDT    
Pharmacy Medication History Note      List of current medications patient is taking is complete.    Source of information: [facility] ([phone]), [email or web address] ([phone]), Epic, PDMP, [facility] dispense report    Changes made to medication list:  Medications removed (include reason, ex. therapy complete or physician discontinued, noncompliance):  none    Medications flagged for provider review:  Famotidine, Ondansetron, Benztropine, Divalproex, Palipderidone, Invega sustenna - patient never picked up prescriptions after discharge from the [facility] in 2023.    Medications added/doses adjusted:  none    Other notes (ex. Recent course of antibiotics, Coumadin dosing):  Left a message with [facility] to confirm patient has not been following with their services as documented in the ED.      Please let me know if you have any questions about this encounter. Thank you!    Electronically signed by Micheal Daivson RPH on 8/2/2024 at 9:39 AM     
Pt refused Ultrasound  
Pt was invited to Group in Day Room but declined to attend and was focused on \"I'm doing good\", and hoping to be discharged soon   . Pt was offered 1:1 conversation which pt engaged in briefly, and also solitary leisure supplies but pt declined also.  Patient remain seclusive to self and in room, pacing in hallway/dayroom at intervals. Pt was polite and in behavioral control.      Q15 minute safety checks maintained for patient safety and will continue to encourage   patient to attend unit programming.       Signature:  Peace Fernandez, CTRS  
RT ASSESSMENT TREATMENT GOALS    [x]Pt Goal:  Pt will identify 1-2 positive coping skills by time of discharge.    []Pt Goal:  Pt will identify 1-2 positive aspects of self by time of discharge.    [x]Pt Goal:  Pt will remain on task/topic for 15-30 minutes during group by time of discharge.    []Pt Goal:  Pt will identify 1-2 aspects of relapse prevention plan by time of discharge.    []Pt Goal:  Pt will join in conversation with peers 1-2 times per group by time of discharge.    []Pt Goal:  Pt will identify 1-2 new leisure interests by time of discharge.    [x]Pt Goal: Pt will maintain behavorial control until the time of discharge.     
The patient was seen face-to-face.  He is pleasant on approach.  He states he has been taking his medication but have noted that he is only taken his medications this morning and did not get them yesterday.  He did require overall as needed medications for agitation yesterday evening.  He said he has got 6 hours of sleep last night which is good for him.  I have encouraged him to keep taking his medication.  No changes were made to his  medications today. 
Lvl 08/02/2024 <10  <10 mg/dL Final    Ethanol percent 08/02/2024 <0.010  % Final    Amphetamine Screen, Ur 08/02/2024 NEGATIVE  NEGATIVE Final    Comment:       (Positive cutoff 1000 ng/mL)                  Barbiturate Screen, Ur 08/02/2024 NEGATIVE  NEGATIVE Final    Comment:       (Positive cutoff 200 ng/mL)                  Benzodiazepine Screen, Urine 08/02/2024 NEGATIVE  NEGATIVE Final    Comment:       (Positive cutoff 200 ng/mL)                  Cocaine Metabolite, Urine 08/02/2024 NEGATIVE  NEGATIVE Final    Comment:       (Positive cutoff 300 ng/mL)                  Methadone Screen, Urine 08/02/2024 NEGATIVE  NEGATIVE Final    Comment:       (Positive cutoff 300 ng/mL)                  Opiates, Urine 08/02/2024 NEGATIVE  NEGATIVE Final    Comment:       (Positive cutoff 300 ng/mL)                  Phencyclidine, Urine 08/02/2024 NEGATIVE  NEGATIVE Final    Comment:       (Positive cutoff 25 ng/mL)                  Cannabinoid Scrn, Ur 08/02/2024 POSITIVE (A)  NEGATIVE Final    Comment:       (Positive cutoff 50 ng/mL)                  Oxycodone Screen, Ur 08/02/2024 NEGATIVE  NEGATIVE Final    Comment:       (Positive cutoff 100 ng/mL)                  Fentanyl, Ur 08/02/2024 NEGATIVE  NEGATIVE Final    Comment:       (Positive cutoff  5 ng/ml)            Test Information 08/02/2024 Assay provides medical screening only.  The absence of expected drug(s) and/or metabolite(s) may indicate diluted or adulterated urine, limitations of testing or timing of collection.   Final    Comment: Testing for legal purposes should be confirmed by another method.  To request confirmation   of test result, please call the lab within 7 days of sample submission.      WBC 08/02/2024 16.2 (H)  3.5 - 11.0 k/uL Final    RBC 08/02/2024 5.51  4.5 - 5.9 m/uL Final    Hemoglobin 08/02/2024 17.7 (H)  13.5 - 17.5 g/dL Final    Hematocrit 08/02/2024 53.4 (H)  41 - 53 % Final    MCV 08/02/2024 96.9  80 - 100 fL Final    [facility] 08/02/2024 
independently saw and evaluated the patient.  I reviewed the  documentation above.  Any additional comments or changes to the    documentation are stated below otherwise agree with assessment.        The patient was seen face-to-face.  He is pleasant on approach.  He states he has been taking his medication but have noted that he is only taken his medications this morning and did not get them yesterday.  He did require overall as needed medications for agitation yesterday evening.  He said he has got 6 hours of sleep last night which is good for him.  I have encouraged him to keep taking his medication.  No changes were made to his  medications today.      PLAN  Medications as noted above  Attempt to develop insight  Psycho-education conducted.  Supportive Therapy conducted.  Follow-up daily while on inpatient unit    Electronically signed by POLA ESPINOSA MD on 8/4/24 at 4:48 PM EDT

## 2024-08-07 VITALS
SYSTOLIC BLOOD PRESSURE: 135 MMHG | DIASTOLIC BLOOD PRESSURE: 68 MMHG | WEIGHT: 150 LBS | BODY MASS INDEX: 19.25 KG/M2 | TEMPERATURE: 97.4 F | RESPIRATION RATE: 16 BRPM | OXYGEN SATURATION: 99 % | HEART RATE: 93 BPM | HEIGHT: 74 IN

## 2024-08-07 PROCEDURE — 6370000000 HC RX 637 (ALT 250 FOR IP): Performed by: PSYCHIATRY & NEUROLOGY

## 2024-08-07 PROCEDURE — 99239 HOSP IP/OBS DSCHRG MGMT >30: CPT | Performed by: PSYCHIATRY & NEUROLOGY

## 2024-08-07 RX ORDER — PALIPERIDONE 3 MG/1
3 TABLET, EXTENDED RELEASE ORAL DAILY
Qty: 30 TABLET | Refills: 0 | Status: SHIPPED | OUTPATIENT
Start: 2024-08-07

## 2024-08-07 RX ORDER — DIVALPROEX SODIUM 500 MG/1
500 TABLET, EXTENDED RELEASE ORAL 2 TIMES DAILY
Qty: 60 TABLET | Refills: 0 | Status: SHIPPED | OUTPATIENT
Start: 2024-08-07

## 2024-08-07 RX ADMIN — PALIPERIDONE 3 MG: 3 TABLET, EXTENDED RELEASE ORAL at 07:51

## 2024-08-07 RX ADMIN — DIVALPROEX SODIUM 500 MG: 500 TABLET, FILM COATED, EXTENDED RELEASE ORAL at 07:51

## 2024-08-07 NOTE — BH NOTE
[facility]  Discharge Note    Pt discharged with followings belongings:   Dental Appliances: None  Vision - Corrective Lenses: None  Hearing Aid: None  Jewelry: None  Body Piercings Removed: N/A  Clothing: Socks, Shorts, Shirt  Other Valuables:  (None)   Valuables sent home with  patient. Patient educated on aftercare instructions: yes  Information faxed to [place] by staff  at 10:08 AM .Patient verbalize understanding of AVS:  Yes, patient left with after visit summary and belongings in a blue car with his mom going home.    Status EXAM upon discharge:  Mental Status and Behavioral Exam  Normal: No  Level of Assistance: Independent/Self  Facial Expression: Flat  Affect: Appropriate  Level of Consciousness: Alert  Frequency of Checks: 4 times per hour, close  Mood:Normal: No  Mood: Anxious, Suspicious  Motor Activity:Normal: No  Motor Activity: Decreased  Eye Contact: Fair  Observed Behavior: Preoccupied, Cooperative  Sexual Misconduct History: Current - no  Preception: [place] to person, [place] to time, [place] to place  Attention:Normal: No  Attention: Distractible  Thought Processes: Circumstantial  Thought Content:Normal: No  Thought Content: Paranoia, Preoccupations  Depression Symptoms: Impaired concentration  Anxiety Symptoms: Generalized  Manuela Symptoms: No problems reported or observed.  Hallucinations: None  Delusions: Yes  Delusions: Paranoid  Memory:Normal: No  Memory: Poor recent  Insight and Judgment: No  Insight and Judgment: Poor judgment, Poor insight, Unmotivated    Tobacco Screening:  Practical Counseling, on admission, janay X, if applicable and completed (first 3 are required if patient doesn't refuse):            ( ) Recognizing danger situations (included triggers and roadblocks)                    ( ) Coping skills (new ways to manage stress,relaxation techniques, changing routine, distraction)                                                           ( ) Basic information about

## 2024-08-07 NOTE — PLAN OF CARE
Problem: Psychosis  Goal: Will report no hallucinations or delusions  Description: INTERVENTIONS:  1. Administer medication as  ordered  2. Assist with reality testing to support increasing orientation  3. Assess if patient's hallucinations or delusions are encouraging self harm or harm to others and intervene as appropriate  8/3/2024 2231 by Toyin Prabhakar RN  Outcome: Progressing  Note: Patient denies any hallucinations. However, patient noted to be suspicious, saying he thinks her sister told lies about him that's why he ended up being here. 1:1 talk time provided. Patient is discharge focused, states he needs to get out of here because he needs to work to provide for his children. Explained to patient that he needs to show that he is going to be safe when he gets discharge by being compliant with treatment and being in control of his behavior. Patient insisting that he is \"fine\", and does not need any medication. Patient continues to refuse prescribed medication despite several attempts to explain its benefits.     Problem: Risk for Elopement  Goal: Patient will not exit the unit/facility without proper excort  Outcome: Progressing  Flowsheets (Taken 8/3/2024 2147)  Nursing Interventions for Elopement Risk:   Make sure patient has all necessary personal care items   Place patient in room far away from exits and stairways   Reduce environmental triggers  Note: Patient was asking when he can leave. Informed pt that he has to talk to a provider about it. No noted elopement behavior noted so far this shift.     
  Problem: Psychosis  Goal: Will report no hallucinations or delusions  Description: INTERVENTIONS:  1. Administer medication as  ordered  2. Assist with reality testing to support increasing orientation  3. Assess if patient's hallucinations or delusions are encouraging self harm or harm to others and intervene as appropriate  Outcome: Progressing  Note: Pt cont to verbalize some paranoid and delusional statements.  Thoughts are loose, disorganized, and bizarre in content. Patient needs redirection at times.  Patient is focused on discharge, and becomes upset when informed that he will not be discharged today. Patient reports that he knows that he has mental health issues and that he has been taking Pt provided with reality orientation as needed and is accepting of support and reassurance given by staff.        
  Problem: Risk for Elopement  Goal: Patient will not exit the unit/facility without proper excort  8/2/2024 1822 by Sonya Cooley, MASHA  Note: Mr. Morelos has not demonstrated exit seeking behaviors during this shift, although he does linger near doors at times until called away.      Problem: Psychosis  Goal: Will report no hallucinations or delusions  Description: INTERVENTIONS:  1. Administer medication as  ordered  2. Assist with reality testing to support increasing orientation  3. Assess if patient's hallucinations or delusions are encouraging self harm or harm to others and intervene as appropriate  8/2/2024 1822 by Sonya Cooley, RN  Note: Mr. Morelos awoke this morning, guarded and paranoid in his room. He was non-verbal, but made animated movements with his head for yes and no, and made gestures for various needs and requests. He came to the unit mid morning and was comfortable around peers and this writer, requiring limit setting related to impulsively hugging male peers. He was pleasant and redirected well. Mr. Morelos has remained in the milieu for most of this shift, exhibiting numerous bizarre behaviors including staring at his peers doors, rapid/repeated/deep inhalations and exhalations, loud often throughout the day. Laying purposefully onto the floor and breathing in the aforementioned manor. He followed a male peer and mimicked his actions and movements. He found his voice briefly during the afternoon and spoke loudly and enthusiastically at staff; thanking them and making other random comments. He became mute again towards the evening and began pacing and staring again. He has been redirectable. He has been offered medication to assist with anxiety multiple times and he has declined, and more recently, he has ignored staff completely and walks away.      
  Problem: Risk for Elopement  Goal: Patient will not exit the unit/facility without proper excort  8/2/2024 2340 by Phillip Abdalla, RN  Outcome: Progressing     Problem: Psychosis  Goal: Will report no hallucinations or delusions  Description: INTERVENTIONS:  1. Administer medication as  ordered  2. Assist with reality testing to support increasing orientation  3. Assess if patient's hallucinations or delusions are encouraging self harm or harm to others and intervene as appropriate  8/2/2024 2340 by Phillip Abdalla, RN  Outcome: Not Progressing     Patient is present in the milieu. Patient was compliant with his Invega that was started this evening. Patient received agitation PRN medications due to frequent wandering into other patient's room and requiring much re-direction due to impulsivity. PRN medications were effective. Patient's mood is anxious, affect is flat. Patient's thought process is illogical. Patient is observed talking to himself at times and presents with significant paranoia. Patient denies suicidal and homicidal ideation, plan or intent, and remains free of self harm. Patient is not showing exit-seeking behaviors nor has he made an attempt at elopement. Patient's pain is currently WDL. Safety maintained with every 15 minute checks.     
  Problem: Risk for Elopement  Goal: Patient will not exit the unit/facility without proper excort  8/4/2024 0930 by Bridgette Martinez RN  Outcome: Progressing  Flowsheets (Taken 8/4/2024 0930)  Nursing Interventions for Elopement Risk:   Assist with personal care needs such as toileting, eating, dressing, as needed to reduce the risk of wandering   Collaborate with family members/caregivers to mitigate the elopement risk   Collaborate with treatment team for drug withdrawal symptoms treatment   Collaborate with treatment team for nicotine replacement   Communicate/escalate to charge nurse the risk of elopement   Communicate/escalate to /other team member the risk of elopement   Communicate/escalate to nursing supervisor the risk of elopement   Communicate to physician the risk for elopement   Make sure patient has all necessary personal care items   Escort with two staff members if patient must leave the unit   Place patient in room far away from exits and stairways   Reduce environmental triggers   Shoes and clothing collected and placed in gown attire     Problem: Psychosis  Goal: Will report no hallucinations or delusions  Description: INTERVENTIONS:  1. Administer medication as  ordered  2. Assist with reality testing to support increasing orientation  3. Assess if patient's hallucinations or delusions are encouraging self harm or harm to others and intervene as appropriate  8/4/2024 0930 by Bridgette Martinez RN  Outcome: Progressing   Patient has been cooperative and friendly this morning. As the morning progressed patient is seen pacing more with fist bawled tight and slamming doors. Patient observed to be agitated after a phone call. RN went to talk to patient. Patient states he needs \"get out of here, I feel trapped, I feel worst in here than out there, I need to go take care of my kids.\" Patient denies depression and anxiety, thoughts to harm self or others. Patient report adequate sleep and 
  Problem: Risk for Elopement  Goal: Patient will not exit the unit/facility without proper excort  8/4/2024 2029 by Annmarie Naqvi RN  Outcome: Progressing     Problem: Psychosis  Goal: Will report no hallucinations or delusions  Description: INTERVENTIONS:  1. Administer medication as  ordered  2. Assist with reality testing to support increasing orientation  3. Assess if patient's hallucinations or delusions are encouraging self harm or harm to others and intervene as appropriate  8/4/2024 2029 by Annmarie Naqvi RN  Outcome: Progressing     Patient denies thoughts of suicide or self harm. Denies any hallucinations, or delusions. Patient has slept good and is eating good. Mood is friendly and cooperative. Will continue to provide encouragement and support as needed. Safe environment maintained. Safety checks continued every 15 minutes.   
  Problem: Risk for Elopement  Goal: Patient will not exit the unit/facility without proper excort  8/6/2024 0904 by Ines Kirk RN  Outcome: Progressing  Note: Mr. Morelos has signed in voluntary as of 8/5/2024 at 1401. Patient does not display elopement risk behavior. Patient is calm, cooperative, compliant with medications and assessment. Patient does exhibit paranoid behaviors, as evidenced by requesting watching all medication packages be opened, watching water be poured and pacing.   8/6/2024 0522 by Geraldine Watt, RN  Note: PT has not attempted to leave unit but states he is going home on Wednesday.  PT denies any suicidal thoughts but is flat and guarded. PT aloof to self on unit but does use the phone a few times this evening. PT maintained on 15 min safety checks and rounds at irregular intervals.        Problem: Psychosis  Goal: Will report no hallucinations or delusions  Description: INTERVENTIONS:  1. Administer medication as  ordered  2. Assist with reality testing to support increasing orientation  3. Assess if patient's hallucinations or delusions are encouraging self harm or harm to others and intervene as appropriate  8/6/2024 0904 by Ines Kirk RN  Outcome: Completed  Note: Mr. Morelos denies auditory and visual hallucinations at time of assessment. Patient does display periods of paranoid behavior as evidenced by pacing, requesting to watch water be poured and medications be opened in front of him.   8/6/2024 0522 by Geraldine Watt, RN  Note: PT denies any hallucinations and does not make any delusional statement but is short and evasive with staff during one to one time. PT appears paranoid and preoccupied. PT states he was just here to get away for a few days and clear his head. PT admits to having a lot of stress but did not wish to discuss. PT was accepting of HS medications. PT declined unit programming. PT remains disheveled. PT paces unit for majority of shift. 
  Problem: Risk for Elopement  Goal: Patient will not exit the unit/facility without proper excort  Note: PT has not attempted to leave unit but states he is going home on Wednesday.  PT denies any suicidal thoughts but is flat and guarded. PT aloof to self on unit but does use the phone a few times this evening. PT maintained on 15 min safety checks and rounds at irregular intervals.        Problem: Psychosis  Goal: Will report no hallucinations or delusions  Description: INTERVENTIONS:  1. Administer medication as  ordered  2. Assist with reality testing to support increasing orientation  3. Assess if patient's hallucinations or delusions are encouraging self harm or harm to others and intervene as appropriate  Note: PT denies any hallucinations and does not make any delusional statement but is short and evasive with staff during one to one time. PT appears paranoid and preoccupied. PT states he was just here to get away for a few days and clear his head. PT admits to having a lot of stress but did not wish to discuss. PT was accepting of HS medications. PT declined unit programming. PT remains disheveled. PT paces unit for majority of shift. PT appears to have rested well this evening.      
  Problem: Risk for Elopement  Goal: Patient will not exit the unit/facility without proper excort  Note: Pt has not attempted to leave the unit but remains focused on discharge. PT denies any hallucinations or suicidal thoughts. PT states he was just stressed out. PT appears paranoid and preoccupied. PT asks staff multiple times this shift if he is going to be safe while he sleeps this evening. Support and reassurance given. PT is taking ordered medications. PT states sleep has improved. PT remains aloof to self on unit and paces for long intervals. PT maintained on 15 min safety checks and rounds at irregular intervals.        
[facility]  Day 3 Interdisciplinary Treatment Plan NOTE    Review Date & Time: 8/4/2024   0900    Admission Type:   Admission Type: Involuntary    Reason for admission:  Reason for Admission: poor sleep, increased paranoia, poor med compliance, not caring for 3 children  Estimated Length of Stay:  5-7 days  Estimated Discharge Date Update:  to be determined by physician    PATIENT STRENGTHS:  Patient Strengths:   Patient Strengths and Limitations:Limitations: Unrealistic self-view, External locus of control, Multiple barriers to leisure interests, Inappropriate/potentially harmful leisure interests, Difficulty problem solving/relies on others to help solve problems  Addictive Behavior:Addictive Behavior  In the Past 3 Months, Have You Felt or Has Someone Told You That You Have a Problem With  :  (JACQUELYN)  Medical Problems:  Past Medical History:   Diagnosis Date    Psychiatric problem        Risk:  Fall Risk   Álvaro Scale Álvaro Scale Score: 22  BVC    Change in scores:  No Changes to plan of Care:  No    Status EXAM:   Mental Status and Behavioral Exam  Normal: No  Level of Assistance: Independent/Self  Facial Expression: Flat, Worried  Affect: Blunt  Level of Consciousness: Alert  Frequency of Checks: 4 times per hour, close  Mood:Normal: No  Mood: Anxious, Suspicious  Motor Activity:Normal: Yes  Motor Activity: Decreased  Eye Contact: Fair  Observed Behavior: Preoccupied, Guarded  Sexual Misconduct History: Current - no  Preception: [place] to person, [place] to time, [place] to place  Attention:Normal: No  Attention: Distractible  Thought Processes: Circumstantial  Thought Content:Normal: No  Thought Content: Preoccupations  Depression Symptoms: No problems reported or observed.  Anxiety Symptoms: Generalized  Manuela Symptoms: Labile, Poor judgment  Hallucinations: None (pt denies)  Delusions: Yes  Delusions: Paranoid  Memory:Normal: No  Memory: Poor recent  Insight and Judgment: No  Insight and 
Poke with Melia RN 14:05 to schedule patient for Cat Scan of Facial bones. RHILARIO stated patient wants CT to be preformed as and Out Patient. Please Contact CT if exam is needed during this admission.  
Problem: Risk for Elopement  Goal: Patient will not exit the unit/facility without proper excort  Outcome: Progressing     Problem: Psychosis  Goal: Will report no hallucinations or delusions  Description: INTERVENTIONS:  1. Administer medication as  ordered  2. Assist with reality testing to support increasing orientation  3. Assess if patient's hallucinations or delusions are encouraging self harm or harm to others and intervene as appropriate  Outcome: Progressing     Patient is open to 1:1 talk time with staff. Patient denies experiencing anxiety but is observed frequently pacing the unit by staff. Patient denies the presence of nay depression, suicidal ideations, homicidal ideations, auditory hallucinations, and/or visual hallucinations. Patient is out and selectively social in the day room and attended group this morning. Patient eats his meals and is compliant with his prescribed psychiatric medication.  
assessments and care planning    Outcome: needs reinforcement    PATIENT GOALS: to be discussed with patient within 72 hours    PLAN/TREATMENT RECOMMENDATIONS:     continue group therapy , medications compliance, goal setting, individualized assessments and care, continue to monitor pt on unit      SHORT-TERM GOALS:   Time frame for Short-Term Goals: 5-7 days    LONG-TERM GOALS:  Time frame for Long-Term Goals: 6 months  Members Present in Team Meeting: See Signature Sheet    Sonya Cooley RN

## 2024-08-07 NOTE — DISCHARGE INSTR - DIET

## 2024-08-07 NOTE — TRANSITION OF CARE
the patient was offered information on these advance directives Patient declined to complete    Patient Instructions: Please continue all medications until otherwise directed by physician.      Tobacco Cessation Discharge Plan:   Is the patient a tobacco user  and needs referral for tobacco cessation? No  Patient referred to the following for tobacco cessation with an appointment? No  Patient was offered medication to assist with tobacco cessation at discharge? No    Alcohol/Substance Abuse Discharge Plan:   Does the patient have a history of substance/alcohol abuse and requires a referral for treatment? No  Patient referred to the following for substance/alcohol abuse treatment with an appointment? No  Patient was offered medication to assist with substance/alcohol abuse cessation at discharge? No      Patient discharged to: Home; Transition record discussed with patient/caregiver and provided this record in hard copy or electronically
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
## 2024-08-07 NOTE — CARE COORDINATION
Name: Darryl Morelos    : 1995    Auth number: [id number]     Discharge Date: 24    Destination: Mom's house     Discharge Medications:      Medication List        CHANGE how you take these medications      divalproex 500 MG extended release tablet  Commonly known as: DEPAKOTE ER  Take 1 tablet by mouth in the morning and at bedtime  What changed:   medication strength  how much to take  Notes to patient: Mood stabilizer     paliperidone 3 MG extended release tablet  Commonly known as: INVEGA  Take 1 tablet by mouth daily  What changed:   medication strength  how much to take  Notes to patient: Mood stabilizer            STOP taking these medications      benztropine 2 MG tablet  Commonly known as: COGENTIN     famotidine 20 MG tablet  Commonly known as: Pepcid     ondansetron 4 MG disintegrating tablet  Commonly known as: ZOFRAN-ODT     paliperidone palmitate  MG/ML Lani IM injection  Commonly known as: INVEGA SUSTENNA               Where to Get Your Medications        These medications were sent to [facility] [id number] - [facility] [address] - P [phone] - F [phone]  [address] [facility] [address]      Phone: [phone]   divalproex 500 MG extended release tablet  paliperidone 3 MG extended release tablet         Follow Up Appointment: [facility]  [address] [place] [id number]    Go on 2024  At 11AM- medication management appointment

## 2024-08-07 NOTE — DISCHARGE INSTRUCTIONS
Information:  Medications:   Medication summary provided   I understand that I should take only the medications on my list.     -why and when I need to take each medicine.     -which side effects to watch for.     -that I should carry my medication information at all times in case of     Emergency situations.    I will take all of my medicines to follow up appointments.     -check with my physician or pharmacist before taking any new    Medication, over the counter product or drink alcohol.    -Ask about food, drug or dietary supplement interactions.    -discard old lists and update records with medication providers.    Notify Physician:  Notify physician if you notice:   Always call 911 if you feel your life is in danger  In case of an emergency call 911 immediately!  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   Call the [employer]'s smoking quitline at 8-009-25K-QUIT  Know the signs of a heart attack   If you have any of the following symptoms call 911 immediately, do not wait more    Than five minutes.    1. Pressure, fullness and/ or squeezing in the center of the chest spreading to    The jaw, neck or shoulder.    2. Chest discomfort with light headedness, fainting, sweating, nausea or    Shortness of breath.   3. Upper abdominal pressure or discomfort.   4. Lower chest pain, back pain, unusual fatigue, shortness of breath, nausea   Or dizziness.     General Information:   Questions regarding your bill:

## 2024-08-07 NOTE — GROUP NOTE
Group Therapy Note    Date: 8/6/2024    Group Start Time: 2045  Group End Time: 2050  Group Topic: Wrap-Up    [facility] JORGE Watt, [place] Meek, RN        Group Therapy Note    Attendees: 2/6       Patient's Goal:  To go home    Notes:  Pt states he is possible going tomorrow with plans to follow up at [facility]. Pt states he likes [facility] \"ok\". PT states \"Ill try taking my medications after discharge\".    Status After Intervention:  Unchanged    Participation Level: Minimal    Participation Quality: Resistant      Speech:  hesitant      Thought Process/Content: Logical      Affective Functioning: Flat      Mood: anxious      Level of consciousness:  Inattentive      Response to Learning: Progressing to goal      Endings: Self-harm and None Reported    Modes of Intervention: Education, Support, and Socialization      Discipline Responsible: Registered Nurse      Signature:  Geraldine Watt RN

## 2024-08-26 ENCOUNTER — APPOINTMENT (OUTPATIENT)
Dept: GENERAL RADIOLOGY | Age: 29
End: 2024-08-26
Payer: COMMERCIAL

## 2024-08-26 ENCOUNTER — HOSPITAL ENCOUNTER (EMERGENCY)
Age: 29
Discharge: HOME | End: 2024-08-26
Attending: EMERGENCY MEDICINE
Payer: COMMERCIAL

## 2024-08-26 VITALS
OXYGEN SATURATION: 100 % | DIASTOLIC BLOOD PRESSURE: 74 MMHG | TEMPERATURE: 98.1 F | SYSTOLIC BLOOD PRESSURE: 117 MMHG | RESPIRATION RATE: 16 BRPM | HEART RATE: 92 BPM

## 2024-08-26 DIAGNOSIS — M25.561 ACUTE PAIN OF RIGHT KNEE: Primary | ICD-10-CM

## 2024-08-26 PROCEDURE — 99283 EMERGENCY DEPT VISIT LOW MDM: CPT

## 2024-08-26 PROCEDURE — 73564 X-RAY EXAM KNEE 4 OR MORE: CPT

## 2024-08-26 ASSESSMENT — ENCOUNTER SYMPTOMS
ABDOMINAL PAIN: 0
SHORTNESS OF BREATH: 0
VOMITING: 0
COUGH: 0
NAUSEA: 0
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
## 2024-08-27 ENCOUNTER — HOSPITAL ENCOUNTER (INPATIENT)
Age: 29
LOS: 15 days | Discharge: HOME | DRG: 751 | End: 2024-09-11
Attending: EMERGENCY MEDICINE | Admitting: PSYCHIATRY & NEUROLOGY
Payer: COMMERCIAL

## 2024-08-27 DIAGNOSIS — F23 ACUTE PSYCHOSIS (HCC): Primary | ICD-10-CM

## 2024-08-27 LAB
ALBUMIN SERPL-MCNC: 4.1 G/DL (ref 3.5–5.2)
ALP SERPL-CCNC: 58 U/L (ref 40–129)
ALT SERPL-CCNC: 50 U/L (ref 5–41)
AMMONIA PLAS-SCNC: 24 UMOL/L (ref 16–60)
ANION GAP SERPL CALCULATED.3IONS-SCNC: 18 MMOL/L (ref 9–17)
APAP SERPL-MCNC: <5 UG/ML (ref 10–30)
AST SERPL-CCNC: 98 U/L
BASOPHILS # BLD: 0 K/UL (ref 0–0.2)
BASOPHILS NFR BLD: 0 % (ref 0–2)
BILIRUB SERPL-MCNC: 1.3 MG/DL (ref 0.3–1.2)
BUN SERPL-MCNC: 7 MG/DL (ref 6–20)
CALCIUM SERPL-MCNC: 9.6 MG/DL (ref 8.6–10.4)
CHLORIDE SERPL-SCNC: 104 MMOL/L (ref 98–107)
CO2 SERPL-SCNC: 17 MMOL/L (ref 20–31)
CREAT SERPL-MCNC: 1 MG/DL (ref 0.7–1.2)
DATE LAST DOSE: ABNORMAL
DATE LAST DOSE: ABNORMAL
EOSINOPHIL # BLD: 0 K/UL (ref 0–0.4)
EOSINOPHILS RELATIVE PERCENT: 0 % (ref 0–4)
ERYTHROCYTE [DISTWIDTH] IN BLOOD BY AUTOMATED COUNT: 14 % (ref 11.5–14.9)
ETHANOL PERCENT: <0.01 %
ETHANOLAMINE SERPL-MCNC: <10 MG/DL (ref 0–0.08)
GFR, ESTIMATED: >90 ML/MIN/1.73M2
GLUCOSE SERPL-MCNC: 99 MG/DL (ref 70–99)
HCT VFR BLD AUTO: 46.5 % (ref 41–53)
HGB BLD-MCNC: 15.6 G/DL (ref 13.5–17.5)
LYMPHOCYTES NFR BLD: 2.97 K/UL (ref 1–4.8)
LYMPHOCYTES RELATIVE PERCENT: 27 % (ref 24–44)
MCH RBC QN AUTO: 33 PG (ref 26–34)
MCHC RBC AUTO-ENTMCNC: 33.6 G/DL (ref 31–37)
MCV RBC AUTO: 98.1 FL (ref 80–100)
MONOCYTES NFR BLD: 0.44 K/UL (ref 0.1–1.3)
MONOCYTES NFR BLD: 4 % (ref 1–7)
MORPHOLOGY: ABNORMAL
NEUTROPHILS NFR BLD: 69 % (ref 36–66)
NEUTS SEG NFR BLD: 7.59 K/UL (ref 1.3–9.1)
PLATELET # BLD AUTO: 255 K/UL (ref 150–450)
PMV BLD AUTO: 8.6 FL (ref 6–12)
POTASSIUM SERPL-SCNC: 3.2 MMOL/L (ref 3.7–5.3)
PROT SERPL-MCNC: 7 G/DL (ref 6.4–8.3)
RBC # BLD AUTO: 4.74 M/UL (ref 4.5–5.9)
SALICYLATES SERPL-MCNC: <1 MG/DL (ref 3–10)
SODIUM SERPL-SCNC: 139 MMOL/L (ref 135–144)
TME LAST DOSE: ABNORMAL H
TME LAST DOSE: ABNORMAL H
VALPROATE SERPL-MCNC: <3 UG/ML (ref 50–125)
VALPROATE SERPL-MCNC: <3 UG/ML (ref 50–125)
VANCOMYCIN DOSE: ABNORMAL MG
VANCOMYCIN DOSE: ABNORMAL MG
WBC OTHER # BLD: 11 K/UL (ref 3.5–11)

## 2024-08-27 PROCEDURE — 80074 ACUTE HEPATITIS PANEL: CPT

## 2024-08-27 PROCEDURE — 82140 ASSAY OF AMMONIA: CPT

## 2024-08-27 PROCEDURE — G0480 DRUG TEST DEF 1-7 CLASSES: HCPCS

## 2024-08-27 PROCEDURE — 80143 DRUG ASSAY ACETAMINOPHEN: CPT

## 2024-08-27 PROCEDURE — 80053 COMPREHEN METABOLIC PANEL: CPT

## 2024-08-27 PROCEDURE — 80179 DRUG ASSAY SALICYLATE: CPT

## 2024-08-27 PROCEDURE — 36415 COLL VENOUS BLD VENIPUNCTURE: CPT

## 2024-08-27 PROCEDURE — 6360000002 HC RX W HCPCS: Performed by: EMERGENCY MEDICINE

## 2024-08-27 PROCEDURE — 96372 THER/PROPH/DIAG INJ SC/IM: CPT

## 2024-08-27 PROCEDURE — 2040000000 HC PSYCH ICU R&B

## 2024-08-27 PROCEDURE — 6370000000 HC RX 637 (ALT 250 FOR IP)

## 2024-08-27 PROCEDURE — 80164 ASSAY DIPROPYLACETIC ACD TOT: CPT

## 2024-08-27 PROCEDURE — 85025 COMPLETE CBC W/AUTO DIFF WBC: CPT

## 2024-08-27 PROCEDURE — 99285 EMERGENCY DEPT VISIT HI MDM: CPT

## 2024-08-27 RX ORDER — MAGNESIUM HYDROXIDE/ALUMINUM HYDROXICE/SIMETHICONE 120; 1200; 1200 MG/30ML; MG/30ML; MG/30ML
30 SUSPENSION ORAL EVERY 6 HOURS PRN
Status: DISCONTINUED | OUTPATIENT
Start: 2024-08-27 | End: 2024-09-11 | Stop reason: HOSPADM

## 2024-08-27 RX ORDER — LANOLIN ALCOHOL/MO/W.PET/CERES
3 CREAM (GRAM) TOPICAL NIGHTLY
Status: DISCONTINUED | OUTPATIENT
Start: 2024-08-27 | End: 2024-09-11 | Stop reason: HOSPADM

## 2024-08-27 RX ORDER — NICOTINE 21 MG/24HR
1 PATCH, TRANSDERMAL 24 HOURS TRANSDERMAL DAILY
Status: DISCONTINUED | OUTPATIENT
Start: 2024-08-27 | End: 2024-09-02

## 2024-08-27 RX ORDER — LORAZEPAM 1 MG/1
2 TABLET ORAL EVERY 6 HOURS PRN
Status: DISCONTINUED | OUTPATIENT
Start: 2024-08-27 | End: 2024-09-11 | Stop reason: HOSPADM

## 2024-08-27 RX ORDER — HYDROXYZINE HYDROCHLORIDE 50 MG/1
50 TABLET, FILM COATED ORAL 3 TIMES DAILY PRN
Status: DISCONTINUED | OUTPATIENT
Start: 2024-08-27 | End: 2024-09-11 | Stop reason: HOSPADM

## 2024-08-27 RX ORDER — ACETAMINOPHEN 325 MG/1
650 TABLET ORAL EVERY 4 HOURS PRN
Status: DISCONTINUED | OUTPATIENT
Start: 2024-08-27 | End: 2024-09-11 | Stop reason: HOSPADM

## 2024-08-27 RX ORDER — DIPHENHYDRAMINE HYDROCHLORIDE 50 MG/ML
50 INJECTION INTRAMUSCULAR; INTRAVENOUS EVERY 6 HOURS PRN
Status: DISCONTINUED | OUTPATIENT
Start: 2024-08-27 | End: 2024-09-11 | Stop reason: HOSPADM

## 2024-08-27 RX ORDER — DIPHENHYDRAMINE HYDROCHLORIDE 50 MG/ML
25 INJECTION INTRAMUSCULAR; INTRAVENOUS ONCE
Status: COMPLETED | OUTPATIENT
Start: 2024-08-27 | End: 2024-08-27

## 2024-08-27 RX ORDER — HALOPERIDOL 5 MG/ML
5 INJECTION INTRAMUSCULAR EVERY 6 HOURS PRN
Status: DISCONTINUED | OUTPATIENT
Start: 2024-08-27 | End: 2024-09-11 | Stop reason: HOSPADM

## 2024-08-27 RX ORDER — LORAZEPAM 2 MG/ML
2 INJECTION INTRAMUSCULAR EVERY 6 HOURS PRN
Status: DISCONTINUED | OUTPATIENT
Start: 2024-08-27 | End: 2024-09-11 | Stop reason: HOSPADM

## 2024-08-27 RX ORDER — LORAZEPAM 2 MG/ML
1 INJECTION INTRAMUSCULAR ONCE
Status: COMPLETED | OUTPATIENT
Start: 2024-08-27 | End: 2024-08-27

## 2024-08-27 RX ORDER — POTASSIUM CHLORIDE 1500 MG/1
40 TABLET, EXTENDED RELEASE ORAL ONCE
Status: DISCONTINUED | OUTPATIENT
Start: 2024-08-27 | End: 2024-09-11 | Stop reason: HOSPADM

## 2024-08-27 RX ORDER — HALOPERIDOL 5 MG/ML
5 INJECTION INTRAMUSCULAR ONCE
Status: COMPLETED | OUTPATIENT
Start: 2024-08-27 | End: 2024-08-27

## 2024-08-27 RX ORDER — IBUPROFEN 400 MG/1
400 TABLET, FILM COATED ORAL EVERY 6 HOURS PRN
Status: DISCONTINUED | OUTPATIENT
Start: 2024-08-27 | End: 2024-09-11 | Stop reason: HOSPADM

## 2024-08-27 RX ORDER — POLYETHYLENE GLYCOL 3350 17 G/17G
17 POWDER, FOR SOLUTION ORAL DAILY PRN
Status: DISCONTINUED | OUTPATIENT
Start: 2024-08-27 | End: 2024-09-11 | Stop reason: HOSPADM

## 2024-08-27 RX ORDER — HALOPERIDOL 5 MG/1
5 TABLET ORAL EVERY 6 HOURS PRN
Status: DISCONTINUED | OUTPATIENT
Start: 2024-08-27 | End: 2024-09-11 | Stop reason: HOSPADM

## 2024-08-27 RX ADMIN — DIPHENHYDRAMINE HYDROCHLORIDE 25 MG: 50 INJECTION INTRAMUSCULAR; INTRAVENOUS at 14:58

## 2024-08-27 RX ADMIN — LORAZEPAM 1 MG: 2 INJECTION INTRAMUSCULAR; INTRAVENOUS at 14:59

## 2024-08-27 RX ADMIN — HALOPERIDOL LACTATE 5 MG: 5 INJECTION, SOLUTION INTRAMUSCULAR at 14:59

## 2024-08-27 ASSESSMENT — LIFESTYLE VARIABLES
HOW OFTEN DO YOU HAVE A DRINK CONTAINING ALCOHOL: MONTHLY OR LESS
HOW MANY STANDARD DRINKS CONTAINING ALCOHOL DO YOU HAVE ON A TYPICAL DAY: 1 OR 2
HOW MANY STANDARD DRINKS CONTAINING ALCOHOL DO YOU HAVE ON A TYPICAL DAY: 1 OR 2
HOW OFTEN DO YOU HAVE A DRINK CONTAINING ALCOHOL: MONTHLY OR LESS

## 2024-08-27 ASSESSMENT — PATIENT HEALTH QUESTIONNAIRE - PHQ9
SUM OF ALL RESPONSES TO PHQ QUESTIONS 1-9: 0
2. FEELING DOWN, DEPRESSED OR HOPELESS: NOT AT ALL
SUM OF ALL RESPONSES TO PHQ9 QUESTIONS 1 & 2: 0
1. LITTLE INTEREST OR PLEASURE IN DOING THINGS: NOT AT ALL
SUM OF ALL RESPONSES TO PHQ QUESTIONS 1-9: 0

## 2024-08-27 ASSESSMENT — SLEEP AND FATIGUE QUESTIONNAIRES
DO YOU USE A SLEEP AID: NO
DO YOU HAVE DIFFICULTY SLEEPING: YES
AVERAGE NUMBER OF SLEEP HOURS: 2
SLEEP PATTERN: DISTURBED/INTERRUPTED SLEEP

## 2024-08-27 ASSESSMENT — PAIN SCALES - GENERAL: PAINLEVEL_OUTOF10: 0

## 2024-08-28 LAB
HAV IGM SERPL QL IA: NONREACTIVE
HBV CORE IGM SERPL QL IA: NONREACTIVE
HBV SURFACE AG SERPL QL IA: NONREACTIVE
HCV AB SERPL QL IA: NONREACTIVE

## 2024-08-28 PROCEDURE — 99222 1ST HOSP IP/OBS MODERATE 55: CPT | Performed by: INTERNAL MEDICINE

## 2024-08-28 PROCEDURE — 6370000000 HC RX 637 (ALT 250 FOR IP)

## 2024-08-28 PROCEDURE — 2040000000 HC PSYCH ICU R&B

## 2024-08-28 PROCEDURE — APPSS60 APP SPLIT SHARED TIME 46-60 MINUTES: Performed by: NURSE PRACTITIONER

## 2024-08-28 PROCEDURE — 99223 1ST HOSP IP/OBS HIGH 75: CPT | Performed by: PSYCHIATRY & NEUROLOGY

## 2024-08-28 RX ORDER — DIVALPROEX SODIUM 500 MG/1
500 TABLET, EXTENDED RELEASE ORAL 2 TIMES DAILY
Status: DISCONTINUED | OUTPATIENT
Start: 2024-08-28 | End: 2024-09-09

## 2024-08-28 RX ORDER — PALIPERIDONE 3 MG/1
3 TABLET, EXTENDED RELEASE ORAL DAILY
Status: DISCONTINUED | OUTPATIENT
Start: 2024-08-28 | End: 2024-08-30

## 2024-08-28 RX ADMIN — LORAZEPAM 2 MG: 1 TABLET ORAL at 13:26

## 2024-08-28 RX ADMIN — HALOPERIDOL 5 MG: 5 TABLET ORAL at 13:26

## 2024-08-28 ASSESSMENT — LIFESTYLE VARIABLES
HOW MANY STANDARD DRINKS CONTAINING ALCOHOL DO YOU HAVE ON A TYPICAL DAY: 1 OR 2
HOW OFTEN DO YOU HAVE A DRINK CONTAINING ALCOHOL: MONTHLY OR LESS

## 2024-08-29 PROCEDURE — 2040000000 HC PSYCH ICU R&B

## 2024-08-29 PROCEDURE — 6360000002 HC RX W HCPCS

## 2024-08-29 PROCEDURE — 99232 SBSQ HOSP IP/OBS MODERATE 35: CPT | Performed by: PSYCHIATRY & NEUROLOGY

## 2024-08-29 PROCEDURE — 99231 SBSQ HOSP IP/OBS SF/LOW 25: CPT | Performed by: INTERNAL MEDICINE

## 2024-08-29 PROCEDURE — APPSS30 APP SPLIT SHARED TIME 16-30 MINUTES: Performed by: NURSE PRACTITIONER

## 2024-08-29 RX ADMIN — LORAZEPAM 2 MG: 2 INJECTION INTRAMUSCULAR; INTRAVENOUS at 06:27

## 2024-08-29 RX ADMIN — DIPHENHYDRAMINE HYDROCHLORIDE 50 MG: 50 INJECTION INTRAMUSCULAR; INTRAVENOUS at 06:27

## 2024-08-29 RX ADMIN — HALOPERIDOL LACTATE 5 MG: 5 INJECTION, SOLUTION INTRAMUSCULAR at 06:27

## 2024-08-30 PROCEDURE — 2040000000 HC PSYCH ICU R&B

## 2024-08-30 PROCEDURE — 99232 SBSQ HOSP IP/OBS MODERATE 35: CPT | Performed by: PSYCHIATRY & NEUROLOGY

## 2024-08-30 PROCEDURE — APPSS30 APP SPLIT SHARED TIME 16-30 MINUTES: Performed by: NURSE PRACTITIONER

## 2024-08-30 RX ORDER — ARIPIPRAZOLE 15 MG/1
15 TABLET ORAL DAILY
Status: DISCONTINUED | OUTPATIENT
Start: 2024-08-30 | End: 2024-09-05

## 2024-08-31 PROCEDURE — 6370000000 HC RX 637 (ALT 250 FOR IP)

## 2024-08-31 PROCEDURE — 2040000000 HC PSYCH ICU R&B

## 2024-08-31 PROCEDURE — 99232 SBSQ HOSP IP/OBS MODERATE 35: CPT | Performed by: NURSE PRACTITIONER

## 2024-08-31 RX ADMIN — HALOPERIDOL 5 MG: 5 TABLET ORAL at 18:33

## 2024-08-31 RX ADMIN — LORAZEPAM 2 MG: 1 TABLET ORAL at 18:33

## 2024-09-01 PROCEDURE — 2040000000 HC PSYCH ICU R&B

## 2024-09-01 PROCEDURE — 99232 SBSQ HOSP IP/OBS MODERATE 35: CPT | Performed by: NURSE PRACTITIONER

## 2024-09-01 PROCEDURE — 6360000002 HC RX W HCPCS

## 2024-09-01 RX ADMIN — LORAZEPAM 2 MG: 2 INJECTION INTRAMUSCULAR; INTRAVENOUS at 14:30

## 2024-09-01 RX ADMIN — DIPHENHYDRAMINE HYDROCHLORIDE 50 MG: 50 INJECTION INTRAMUSCULAR; INTRAVENOUS at 14:30

## 2024-09-01 RX ADMIN — HALOPERIDOL LACTATE 5 MG: 5 INJECTION, SOLUTION INTRAMUSCULAR at 14:30

## 2024-09-02 PROCEDURE — 99232 SBSQ HOSP IP/OBS MODERATE 35: CPT | Performed by: PSYCHIATRY & NEUROLOGY

## 2024-09-02 PROCEDURE — 6360000002 HC RX W HCPCS

## 2024-09-02 PROCEDURE — APPSS30 APP SPLIT SHARED TIME 16-30 MINUTES: Performed by: NURSE PRACTITIONER

## 2024-09-02 PROCEDURE — 2040000000 HC PSYCH ICU R&B

## 2024-09-02 RX ADMIN — LORAZEPAM 2 MG: 2 INJECTION INTRAMUSCULAR; INTRAVENOUS at 14:40

## 2024-09-02 RX ADMIN — HALOPERIDOL LACTATE 5 MG: 5 INJECTION, SOLUTION INTRAMUSCULAR at 14:40

## 2024-09-02 RX ADMIN — DIPHENHYDRAMINE HYDROCHLORIDE 50 MG: 50 INJECTION INTRAMUSCULAR; INTRAVENOUS at 14:40

## 2024-09-02 ASSESSMENT — PAIN SCALES - GENERAL: PAINLEVEL_OUTOF10: 0

## 2024-09-02 ASSESSMENT — LIFESTYLE VARIABLES
HOW OFTEN DO YOU HAVE A DRINK CONTAINING ALCOHOL: MONTHLY OR LESS
HOW MANY STANDARD DRINKS CONTAINING ALCOHOL DO YOU HAVE ON A TYPICAL DAY: 1 OR 2

## 2024-09-03 PROCEDURE — 2040000000 HC PSYCH ICU R&B

## 2024-09-03 PROCEDURE — APPSS30 APP SPLIT SHARED TIME 16-30 MINUTES

## 2024-09-03 PROCEDURE — 99232 SBSQ HOSP IP/OBS MODERATE 35: CPT | Performed by: PSYCHIATRY & NEUROLOGY

## 2024-09-04 PROCEDURE — 2040000000 HC PSYCH ICU R&B

## 2024-09-04 PROCEDURE — 99232 SBSQ HOSP IP/OBS MODERATE 35: CPT | Performed by: PSYCHIATRY & NEUROLOGY

## 2024-09-04 PROCEDURE — APPSS30 APP SPLIT SHARED TIME 16-30 MINUTES

## 2024-09-04 PROCEDURE — 6370000000 HC RX 637 (ALT 250 FOR IP)

## 2024-09-04 RX ADMIN — POLYETHYLENE GLYCOL 3350 17 G: 17 POWDER, FOR SOLUTION ORAL at 17:07

## 2024-09-04 ASSESSMENT — PAIN SCALES - GENERAL: PAINLEVEL_OUTOF10: 0

## 2024-09-05 PROCEDURE — APPSS30 APP SPLIT SHARED TIME 16-30 MINUTES

## 2024-09-05 PROCEDURE — 2040000000 HC PSYCH ICU R&B

## 2024-09-05 PROCEDURE — 99232 SBSQ HOSP IP/OBS MODERATE 35: CPT | Performed by: PSYCHIATRY & NEUROLOGY

## 2024-09-05 PROCEDURE — 6370000000 HC RX 637 (ALT 250 FOR IP)

## 2024-09-05 RX ORDER — ARIPIPRAZOLE 15 MG/1
15 TABLET ORAL DAILY
Status: DISCONTINUED | OUTPATIENT
Start: 2024-09-05 | End: 2024-09-09

## 2024-09-05 RX ADMIN — LORAZEPAM 2 MG: 1 TABLET ORAL at 10:37

## 2024-09-05 RX ADMIN — HALOPERIDOL 5 MG: 5 TABLET ORAL at 10:37

## 2024-09-06 PROBLEM — S80.211A ABRASION OF RIGHT KNEE: Status: ACTIVE | Noted: 2024-09-06

## 2024-09-06 PROCEDURE — 99221 1ST HOSP IP/OBS SF/LOW 40: CPT

## 2024-09-06 PROCEDURE — 99232 SBSQ HOSP IP/OBS MODERATE 35: CPT | Performed by: PSYCHIATRY & NEUROLOGY

## 2024-09-06 PROCEDURE — 6370000000 HC RX 637 (ALT 250 FOR IP): Performed by: PSYCHIATRY & NEUROLOGY

## 2024-09-06 PROCEDURE — 2040000000 HC PSYCH ICU R&B

## 2024-09-06 RX ADMIN — ARIPIPRAZOLE 15 MG: 15 TABLET ORAL at 09:11

## 2024-09-07 PROCEDURE — 99232 SBSQ HOSP IP/OBS MODERATE 35: CPT

## 2024-09-07 PROCEDURE — 6370000000 HC RX 637 (ALT 250 FOR IP): Performed by: PSYCHIATRY & NEUROLOGY

## 2024-09-07 PROCEDURE — 2040000000 HC PSYCH ICU R&B

## 2024-09-07 RX ADMIN — DIVALPROEX SODIUM 500 MG: 500 TABLET, FILM COATED, EXTENDED RELEASE ORAL at 21:14

## 2024-09-07 RX ADMIN — ARIPIPRAZOLE 15 MG: 15 TABLET ORAL at 08:30

## 2024-09-07 RX ADMIN — DIVALPROEX SODIUM 500 MG: 500 TABLET, FILM COATED, EXTENDED RELEASE ORAL at 08:31

## 2024-09-08 PROCEDURE — 6370000000 HC RX 637 (ALT 250 FOR IP): Performed by: PSYCHIATRY & NEUROLOGY

## 2024-09-08 PROCEDURE — 99232 SBSQ HOSP IP/OBS MODERATE 35: CPT

## 2024-09-08 PROCEDURE — 6370000000 HC RX 637 (ALT 250 FOR IP)

## 2024-09-08 PROCEDURE — 2040000000 HC PSYCH ICU R&B

## 2024-09-08 RX ORDER — DOCUSATE SODIUM 100 MG/1
100 CAPSULE, LIQUID FILLED ORAL DAILY
Status: DISCONTINUED | OUTPATIENT
Start: 2024-09-08 | End: 2024-09-09

## 2024-09-08 RX ADMIN — ARIPIPRAZOLE 15 MG: 15 TABLET ORAL at 07:46

## 2024-09-08 RX ADMIN — DIVALPROEX SODIUM 500 MG: 500 TABLET, FILM COATED, EXTENDED RELEASE ORAL at 20:00

## 2024-09-08 RX ADMIN — DOCUSATE SODIUM 100 MG: 100 CAPSULE, LIQUID FILLED ORAL at 16:56

## 2024-09-08 RX ADMIN — POLYETHYLENE GLYCOL 3350 17 G: 17 POWDER, FOR SOLUTION ORAL at 08:43

## 2024-09-08 RX ADMIN — DIVALPROEX SODIUM 500 MG: 500 TABLET, FILM COATED, EXTENDED RELEASE ORAL at 07:46

## 2024-09-09 PROCEDURE — 6360000002 HC RX W HCPCS

## 2024-09-09 PROCEDURE — 99232 SBSQ HOSP IP/OBS MODERATE 35: CPT | Performed by: PSYCHIATRY & NEUROLOGY

## 2024-09-09 PROCEDURE — 2040000000 HC PSYCH ICU R&B

## 2024-09-09 PROCEDURE — 6370000000 HC RX 637 (ALT 250 FOR IP): Performed by: INTERNAL MEDICINE

## 2024-09-09 PROCEDURE — 6370000000 HC RX 637 (ALT 250 FOR IP)

## 2024-09-09 PROCEDURE — APPSS30 APP SPLIT SHARED TIME 16-30 MINUTES

## 2024-09-09 PROCEDURE — 99232 SBSQ HOSP IP/OBS MODERATE 35: CPT | Performed by: INTERNAL MEDICINE

## 2024-09-09 PROCEDURE — 6370000000 HC RX 637 (ALT 250 FOR IP): Performed by: PSYCHIATRY & NEUROLOGY

## 2024-09-09 RX ORDER — SENNOSIDES A AND B 8.6 MG/1
1 TABLET, FILM COATED ORAL NIGHTLY
Status: DISCONTINUED | OUTPATIENT
Start: 2024-09-09 | End: 2024-09-11 | Stop reason: HOSPADM

## 2024-09-09 RX ORDER — DOCUSATE SODIUM 100 MG/1
100 CAPSULE, LIQUID FILLED ORAL 2 TIMES DAILY PRN
Status: DISCONTINUED | OUTPATIENT
Start: 2024-09-09 | End: 2024-09-11 | Stop reason: HOSPADM

## 2024-09-09 RX ADMIN — DOCUSATE SODIUM 100 MG: 100 CAPSULE, LIQUID FILLED ORAL at 07:58

## 2024-09-09 RX ADMIN — SENNOSIDES 8.6 MG: 8.6 TABLET, FILM COATED ORAL at 12:40

## 2024-09-09 RX ADMIN — DIVALPROEX SODIUM 500 MG: 500 TABLET, FILM COATED, EXTENDED RELEASE ORAL at 07:58

## 2024-09-09 RX ADMIN — ARIPIPRAZOLE 15 MG: 15 TABLET ORAL at 07:58

## 2024-09-09 RX ADMIN — LORAZEPAM 2 MG: 2 INJECTION INTRAMUSCULAR; INTRAVENOUS at 20:23

## 2024-09-09 RX ADMIN — DIPHENHYDRAMINE HYDROCHLORIDE 50 MG: 50 INJECTION INTRAMUSCULAR; INTRAVENOUS at 20:23

## 2024-09-09 RX ADMIN — HALOPERIDOL LACTATE 5 MG: 5 INJECTION, SOLUTION INTRAMUSCULAR at 20:23

## 2024-09-10 PROCEDURE — 99232 SBSQ HOSP IP/OBS MODERATE 35: CPT | Performed by: PSYCHIATRY & NEUROLOGY

## 2024-09-10 PROCEDURE — 6370000000 HC RX 637 (ALT 250 FOR IP): Performed by: PSYCHIATRY & NEUROLOGY

## 2024-09-10 PROCEDURE — 2040000000 HC PSYCH ICU R&B

## 2024-09-10 PROCEDURE — APPSS30 APP SPLIT SHARED TIME 16-30 MINUTES: Performed by: NURSE PRACTITIONER

## 2024-09-10 PROCEDURE — 6370000000 HC RX 637 (ALT 250 FOR IP): Performed by: INTERNAL MEDICINE

## 2024-09-10 RX ORDER — DIVALPROEX SODIUM 500 MG/1
500 TABLET, EXTENDED RELEASE ORAL 2 TIMES DAILY
Status: DISCONTINUED | OUTPATIENT
Start: 2024-09-10 | End: 2024-09-11 | Stop reason: HOSPADM

## 2024-09-10 RX ADMIN — DIVALPROEX SODIUM 500 MG: 500 TABLET, EXTENDED RELEASE ORAL at 20:42

## 2024-09-10 RX ADMIN — DIVALPROEX SODIUM 500 MG: 500 TABLET, EXTENDED RELEASE ORAL at 09:50

## 2024-09-10 RX ADMIN — SENNOSIDES 8.6 MG: 8.6 TABLET, FILM COATED ORAL at 20:42

## 2024-09-11 VITALS
RESPIRATION RATE: 16 BRPM | BODY MASS INDEX: 20.53 KG/M2 | TEMPERATURE: 97.9 F | DIASTOLIC BLOOD PRESSURE: 52 MMHG | HEART RATE: 83 BPM | OXYGEN SATURATION: 100 % | SYSTOLIC BLOOD PRESSURE: 110 MMHG | WEIGHT: 160 LBS | HEIGHT: 74 IN

## 2024-09-11 PROCEDURE — 99239 HOSP IP/OBS DSCHRG MGMT >30: CPT | Performed by: PSYCHIATRY & NEUROLOGY

## 2024-09-11 PROCEDURE — 6370000000 HC RX 637 (ALT 250 FOR IP): Performed by: PSYCHIATRY & NEUROLOGY

## 2024-09-11 RX ORDER — PSEUDOEPHEDRINE HCL 30 MG
100 TABLET ORAL 2 TIMES DAILY PRN
Qty: 60 CAPSULE | Refills: 0 | Status: SHIPPED | OUTPATIENT
Start: 2024-09-11

## 2024-09-11 RX ORDER — DIVALPROEX SODIUM 500 MG/1
500 TABLET, EXTENDED RELEASE ORAL 2 TIMES DAILY
Qty: 30 TABLET | Refills: 3 | Status: SHIPPED | OUTPATIENT
Start: 2024-09-11

## 2024-09-11 RX ADMIN — DIVALPROEX SODIUM 500 MG: 500 TABLET, EXTENDED RELEASE ORAL at 07:50

## 2024-11-24 ENCOUNTER — HOSPITAL ENCOUNTER (OUTPATIENT)
Dept: ULTRASOUND IMAGING | Age: 29
Discharge: HOME | End: 2024-11-26
Payer: COMMERCIAL

## 2024-11-24 DIAGNOSIS — R74.01 ELEVATED TRANSAMINASE LEVEL: ICD-10-CM

## 2024-11-24 PROCEDURE — 76705 ECHO EXAM OF ABDOMEN: CPT
